# Patient Record
Sex: MALE | Race: WHITE | Employment: PART TIME | URBAN - METROPOLITAN AREA
[De-identification: names, ages, dates, MRNs, and addresses within clinical notes are randomized per-mention and may not be internally consistent; named-entity substitution may affect disease eponyms.]

---

## 2018-10-07 ENCOUNTER — HOSPITAL ENCOUNTER (EMERGENCY)
Age: 33
Discharge: HOME | End: 2018-10-07
Payer: COMMERCIAL

## 2018-10-07 VITALS
RESPIRATION RATE: 16 BRPM | WEIGHT: 150 LBS | OXYGEN SATURATION: 98 % | HEIGHT: 71 IN | HEART RATE: 90 BPM | TEMPERATURE: 98.6 F | SYSTOLIC BLOOD PRESSURE: 132 MMHG | DIASTOLIC BLOOD PRESSURE: 84 MMHG | BODY MASS INDEX: 21 KG/M2

## 2018-10-07 DIAGNOSIS — T15.91XA FOREIGN BODY OF RIGHT EYE, INITIAL ENCOUNTER: Primary | ICD-10-CM

## 2018-10-07 PROCEDURE — 99282 EMERGENCY DEPT VISIT SF MDM: CPT

## 2018-10-07 PROCEDURE — 6370000000 HC RX 637 (ALT 250 FOR IP): Performed by: PHYSICIAN ASSISTANT

## 2018-10-07 PROCEDURE — 4500000022 HC ED LEVEL 2 PROCEDURE

## 2018-10-07 RX ORDER — BACITRACIN ZINC AND POLYMYXIN B SULFATE 500; 1000 [USP'U]/G; [USP'U]/G
OINTMENT TOPICAL 3 TIMES DAILY
Status: DISCONTINUED | OUTPATIENT
Start: 2018-10-07 | End: 2018-10-07

## 2018-10-07 RX ORDER — BACITRACIN ZINC AND POLYMYXIN B SULFATE 500; 10000 [USP'U]/G; [USP'U]/G
OINTMENT OPHTHALMIC 3 TIMES DAILY
Status: DISCONTINUED | OUTPATIENT
Start: 2018-10-07 | End: 2018-10-07 | Stop reason: HOSPADM

## 2018-10-07 RX ADMIN — BACITRACIN ZINC AND POLYMYXIN B SULFATES: 500; 10000 OINTMENT OPHTHALMIC at 20:11

## 2018-10-07 ASSESSMENT — PAIN DESCRIPTION - LOCATION: LOCATION: EAR

## 2018-10-07 ASSESSMENT — PAIN SCALES - GENERAL: PAINLEVEL_OUTOF10: 2

## 2018-10-07 ASSESSMENT — PAIN DESCRIPTION - PAIN TYPE: TYPE: ACUTE PAIN

## 2018-10-07 ASSESSMENT — PAIN DESCRIPTION - ORIENTATION: ORIENTATION: RIGHT

## 2018-10-08 NOTE — ED PROVIDER NOTES
[facility]  ED  eMERGENCY dEPARTMENT eNCOUnter        Pt Name: Corinne Steve  MRN: [id number]  Jeanettegfgenevieve 1985  Date of evaluation: 10/7/2018  Provider: Magdiel Cruz PA-C  PCP: No primary care provider on file. ED Attending: Gregg Loyd MD      History is provided by the patient    CHIEF COMPLAINT:  Foreign Body in Eye (was working with metal and thinks a piece got in right eye )      HISTORY OF PRESENT ILLNESS:  Corinne Steve is a 29 y.o. male who presents to the ED via private vehicle with complaints of metal in right eye. The patient was working with metal today and believes he got a piece of metal in his right eye. He states this has happened to him on multiple occasions in the past. He has been seen in the ED in the past for this as well as at [facility]. He does not wear contact lenses or glasses. The incident happened just prior to arrival.  He is able to see small cortney of dirt or metal just medial to the right cornea but was unsuccessful in his attempts at getting out at home. He denies any visual change. He is just very slight pain/irritation that he rates 2/10. He has no other complaints. He tells me his tetanus vaccination is up-to-date, 2009. There are no modifying factors or associated symptoms. Nursing notes reviewed. History reviewed. No pertinent past medical history. Past Surgical History:   Procedure Laterality Date    WISDOM TOOTH EXTRACTION       History reviewed. No pertinent family history. Social History     Social History    Marital status: Single     Spouse name: N/A    Number of children: N/A    Years of education: N/A     Occupational History    Not on file.      Social History Main Topics    Smoking status: Former Smoker     Packs/day: 1.00     Years: 10.00     Types: Cigarettes     Quit date: 3/1/2016    Smokeless tobacco: Never Used    Alcohol use No    Drug use: No    Sexual activity: Not on file     Other Topics Concern    Not on file     Social History Narrative    No narrative on file     Current Facility-Administered Medications   Medication Dose Route Frequency Provider Last Rate Last Dose    bacitracin-polymyxin b (POLYSPORIN) ophthalmic ointment   Right Eye TID SANTO Florez         No current outpatient prescriptions on file. Allergies   Allergen Reactions    Erythromycin Swelling       REVIEW OF SYSTEMS:  6 systems reviewed, pertinent positives per HPI otherwise noted to be negative. PHYSICAL EXAM:  /84   Pulse 90   Temp 98.6 °F (37 °C) (Oral)   Resp 16   Ht 5' 11\" (1.803 m)   Wt 150 lb (68 kg)   SpO2 98%   BMI 20.92 kg/m²   CONSTITUTIONAL: Awake and alert. Well-developed. Well-nourished. Non-toxic. Cooperative. No acute distress. HENT: Normocephalic. Atraumatic. External ears normal, without discharge. Nose normal. Mucous membranes moist.  EYES: Right conjunctiva injected. Left conjunctiva noninjected. No discharge. No scleral icterus. PERRL. EOM's grossly intact. On gross inspection of right eye there is a small, dark, foreign body noted just medial to the right cornea on the sclera. NECK: Supple. Normal ROM. CARDIOVASCULAR: Normal heart rate. Intact distal pulses. PULMONARY/CHEST WALL: Breathing is unlabored. Equal, symmetric chest rise. Speaking comfortably in full sentences. ABDOMEN: Nondistended  MUSKULOSKELETAL: Normal ROM. No acute deformities. SKIN: Warm and dry. NEUROLOGICAL: Alert and oriented x 3. Strength is 5/5 in all extremities and sensation is intact. PSYCHIATRIC: Normal affect    Labs:    NONE    RADIOLOGY:    NONE                    PROCEDURE: Foreign body removal:  Yusra Hamm or their surrogate had an opportunity to ask questions, and the risks, benefits, and alternatives were discussed. A local anesthetic (tetracaine) was used to completely anesthetize the surface of the eye.   At the patient's request, I initially used a cotton tip applicator coated with

## 2018-10-08 NOTE — ED NOTES
Patient verbalized understanding of d/c instructions. No prescriptions given.       Owen Cano LPN  63/23/06 [id number]

## 2018-10-11 ENCOUNTER — OFFICE VISIT (OUTPATIENT)
Dept: INTERNAL MEDICINE CLINIC | Age: 33
End: 2018-10-11
Payer: COMMERCIAL

## 2018-10-11 VITALS
WEIGHT: 150.6 LBS | BODY MASS INDEX: 20.4 KG/M2 | SYSTOLIC BLOOD PRESSURE: 120 MMHG | HEIGHT: 72 IN | DIASTOLIC BLOOD PRESSURE: 72 MMHG

## 2018-10-11 DIAGNOSIS — Z00.00 WELL ADULT EXAM: Primary | ICD-10-CM

## 2018-10-11 PROCEDURE — G8427 DOCREV CUR MEDS BY ELIG CLIN: HCPCS | Performed by: NURSE PRACTITIONER

## 2018-10-11 PROCEDURE — 1036F TOBACCO NON-USER: CPT | Performed by: NURSE PRACTITIONER

## 2018-10-11 PROCEDURE — G8420 CALC BMI NORM PARAMETERS: HCPCS | Performed by: NURSE PRACTITIONER

## 2018-10-11 PROCEDURE — 99385 PREV VISIT NEW AGE 18-39: CPT | Performed by: NURSE PRACTITIONER

## 2018-10-11 PROCEDURE — G8484 FLU IMMUNIZE NO ADMIN: HCPCS | Performed by: NURSE PRACTITIONER

## 2018-10-11 ASSESSMENT — ENCOUNTER SYMPTOMS
EYE DISCHARGE: 0
DIARRHEA: 0
SHORTNESS OF BREATH: 0
NAUSEA: 0
VOMITING: 0
WHEEZING: 0
ABDOMINAL PAIN: 0
COUGH: 0
BLOOD IN STOOL: 0
CONSTIPATION: 0

## 2018-10-11 ASSESSMENT — PATIENT HEALTH QUESTIONNAIRE - PHQ9
1. LITTLE INTEREST OR PLEASURE IN DOING THINGS: 0
SUM OF ALL RESPONSES TO PHQ QUESTIONS 1-9: 0
SUM OF ALL RESPONSES TO PHQ QUESTIONS 1-9: 0
SUM OF ALL RESPONSES TO PHQ9 QUESTIONS 1 & 2: 0
2. FEELING DOWN, DEPRESSED OR HOPELESS: 0

## 2018-10-11 NOTE — PROGRESS NOTES
Genitourinary: Negative for dysuria and hematuria. Musculoskeletal: Negative for myalgias. Skin: Negative for rash. Neurological: Negative for dizziness. Psychiatric/Behavioral: The patient is not nervous/anxious. Physical Exam   Constitutional: He is oriented to person, place, and time. No distress. HENT:   Right Ear: External ear normal.   Left Ear: External ear normal.   Mouth/Throat: Oropharynx is clear and moist. No oropharyngeal exudate. Eyes: Right eye exhibits no discharge. Left eye exhibits no discharge. No scleral icterus. Neck: Normal range of motion. No thyromegaly present. Cardiovascular: Normal rate, regular rhythm, normal heart sounds and intact distal pulses. Exam reveals no gallop and no friction rub. No murmur heard. Pulmonary/Chest: Effort normal and breath sounds normal. He has no wheezes. Abdominal: Soft. Bowel sounds are normal. He exhibits no distension. There is no tenderness. Musculoskeletal: Normal range of motion. He exhibits no edema or tenderness. Lymphadenopathy:     He has no cervical adenopathy. Neurological: He is alert and oriented to person, place, and time. He displays normal reflexes. No cranial nerve deficit. He exhibits normal muscle tone. Coordination normal.   Cn 2-12 intact   Skin: Skin is warm and dry. No rash noted. He is not diaphoretic. No erythema. Psychiatric: Judgment normal.      Diagnosis Orders   1.  Well adult exam        PE completed  Form of findings filled out; he will return next week to start 2 part PPD testing with documentation  PMH info scanned to chart  Flu shot refused    Ji Alves, APRN - CNP

## 2018-11-28 ENCOUNTER — OFFICE VISIT (OUTPATIENT)
Dept: INTERNAL MEDICINE CLINIC | Age: 33
End: 2018-11-28
Payer: COMMERCIAL

## 2018-11-28 VITALS
HEIGHT: 71 IN | DIASTOLIC BLOOD PRESSURE: 70 MMHG | BODY MASS INDEX: 21 KG/M2 | SYSTOLIC BLOOD PRESSURE: 120 MMHG | WEIGHT: 150 LBS | TEMPERATURE: 98.8 F

## 2018-11-28 DIAGNOSIS — B35.1 ONYCHOMYCOSIS OF GREAT TOE: Primary | ICD-10-CM

## 2018-11-28 PROCEDURE — G8427 DOCREV CUR MEDS BY ELIG CLIN: HCPCS | Performed by: NURSE PRACTITIONER

## 2018-11-28 PROCEDURE — 99213 OFFICE O/P EST LOW 20 MIN: CPT | Performed by: NURSE PRACTITIONER

## 2018-11-28 PROCEDURE — 1036F TOBACCO NON-USER: CPT | Performed by: NURSE PRACTITIONER

## 2018-11-28 PROCEDURE — G8420 CALC BMI NORM PARAMETERS: HCPCS | Performed by: NURSE PRACTITIONER

## 2018-11-28 PROCEDURE — G8484 FLU IMMUNIZE NO ADMIN: HCPCS | Performed by: NURSE PRACTITIONER

## 2018-11-28 ASSESSMENT — ENCOUNTER SYMPTOMS
SHORTNESS OF BREATH: 0
EYE DISCHARGE: 0
BLOOD IN STOOL: 0
VOMITING: 0
WHEEZING: 0
DIARRHEA: 0
ABDOMINAL PAIN: 0
NAUSEA: 0
CONSTIPATION: 0
COUGH: 0

## 2019-08-08 ENCOUNTER — TELEPHONE (OUTPATIENT)
Dept: INTERNAL MEDICINE CLINIC | Age: 34
End: 2019-08-08

## 2020-02-06 ENCOUNTER — TELEPHONE (OUTPATIENT)
Dept: INTERNAL MEDICINE CLINIC | Age: 35
End: 2020-02-06

## 2020-02-06 NOTE — TELEPHONE ENCOUNTER
Having a nose issue looking for a referral to ENT has post nasal drip states its a chronic thing. Had a prior referral to ENT and he states that he never went. Offered to give him the information and he does not want it wants to know what you think? Susan Nievesky   1985    Is requesting a referral to see the following specialist: ENT  Name of provider / Doctor: Unknown    Phone. Unknown  Fax. Unknown  Specialty: ENT  Specialist appointment scheduled (DOS)? Not schd yet  Primary reason / diagnosis (ICD. 10code) for the appointment: Post nasal drip  Primary Insurance: [facility] [facility])  Last office visit: 11/28/2018    Pending office visit: No future appointments.

## 2020-02-06 NOTE — TELEPHONE ENCOUNTER
For chronic PND would suggest allergra or claritin, flonase with one spray each nares twice a day and salt water gargles. Would not at this time consider an ENT referral.  I believe he was here to get a form filled out in 2018 and stated his intention to establish but has never returned.  If he wants to be followed here, needs to have an appointment

## 2020-02-11 ENCOUNTER — OFFICE VISIT (OUTPATIENT)
Dept: INTERNAL MEDICINE CLINIC | Age: 35
End: 2020-02-11
Payer: COMMERCIAL

## 2020-02-11 VITALS
WEIGHT: 148 LBS | HEART RATE: 54 BPM | BODY MASS INDEX: 20.05 KG/M2 | OXYGEN SATURATION: 98 % | DIASTOLIC BLOOD PRESSURE: 70 MMHG | SYSTOLIC BLOOD PRESSURE: 100 MMHG | HEIGHT: 72 IN

## 2020-02-11 PROCEDURE — G8427 DOCREV CUR MEDS BY ELIG CLIN: HCPCS | Performed by: NURSE PRACTITIONER

## 2020-02-11 PROCEDURE — 1036F TOBACCO NON-USER: CPT | Performed by: NURSE PRACTITIONER

## 2020-02-11 PROCEDURE — 99213 OFFICE O/P EST LOW 20 MIN: CPT | Performed by: NURSE PRACTITIONER

## 2020-02-11 PROCEDURE — G8420 CALC BMI NORM PARAMETERS: HCPCS | Performed by: NURSE PRACTITIONER

## 2020-02-11 PROCEDURE — G8484 FLU IMMUNIZE NO ADMIN: HCPCS | Performed by: NURSE PRACTITIONER

## 2020-02-11 ASSESSMENT — PATIENT HEALTH QUESTIONNAIRE - PHQ9
SUM OF ALL RESPONSES TO PHQ9 QUESTIONS 1 & 2: 0
1. LITTLE INTEREST OR PLEASURE IN DOING THINGS: 0
SUM OF ALL RESPONSES TO PHQ QUESTIONS 1-9: 0
SUM OF ALL RESPONSES TO PHQ QUESTIONS 1-9: 0
2. FEELING DOWN, DEPRESSED OR HOPELESS: 0

## 2020-02-11 ASSESSMENT — ENCOUNTER SYMPTOMS
EYE DISCHARGE: 0
CONSTIPATION: 0
VOMITING: 0
WHEEZING: 0
BLOOD IN STOOL: 0
NAUSEA: 0
COUGH: 0
ABDOMINAL PAIN: 0
SHORTNESS OF BREATH: 0
DIARRHEA: 0

## 2020-02-11 NOTE — PATIENT INSTRUCTIONS
Allegra or claritin  Sinus rinse twice a day followed by:  Flonase-1 spray each nares twice  A day  Salt water gargles both morning and night  With any recurrence of chest tightness associated with running call for an order for albuterol inhaler

## 2020-08-21 ENCOUNTER — TELEPHONE (OUTPATIENT)
Dept: INTERNAL MEDICINE CLINIC | Age: 35
End: 2020-08-21

## 2020-08-21 NOTE — TELEPHONE ENCOUNTER
----- Message from [place], [place] sent at 8/21/2020  1:43 PM EDT -----  Subject: Appointment Request    Reason for Call: Routine Physical Exam    QUESTIONS  Type of Appointment? Established Patient  Reason for appointment request? No appointments available during search  Additional Information for Provider? Pt needs to have a basic physical to   be able to join  classes. He needs this no later than Tuesday. He was referred to  urgent care but he wants to stay away from any Covid   testing sites. Can anyone accommodate Mr. Drea Macdonald with his request. Please   call  ---------------------------------------------------------------------------  --------------  CALL BACK INFO  What is the best way for the office to contact you? OK to leave message on   voicemail  Preferred Call Back Phone Number? [phone]  ---------------------------------------------------------------------------  --------------  SCRIPT ANSWERS  Relationship to Patient? Self  Appointment reason? Well Care/Follow Ups  Select a Well Care/Follow Ups appointment reason? Adult Physical Exam   [Medicare Annual Wellness   AWV   PAP   Pelvic]  (Is the patient requesting to be seen urgently for their symptoms?)? No  (If the patient is female   ask this question) Are you requesting a pap smear with your physical   exam? No  (Patient is Medicare and requesting Annual Wellness Visit)? No  Have you been diagnosed with   tested for   or told that you are suspected of having COVID-19 (Coronavirus)? No  Have you had a fever or taken medication to treat a fever within the past   3 days? No  Have you had a cough   shortness of breath or flu-like symptoms within the past 3 days? No  Do you currently have flu-like symptoms including fever or chills   cough   shortness of breath   or difficulty breathing   or new loss of taste or smell? No  (Service Expert  click yes below to proceed with [facility] [employer] As Usual   Scheduling)?  Yes Spoke with pt and conveyed her results/recommendations as per MD.    Pt wondering if having been on abx previously may have contributed to these findings/sx. Reports onset of sx correlated after she had been on abx a while back. Conveys has dental appt next week, needs prophylactic abx and asking if that will affect GI tract/if anything that can be done to prevent irritation if she needs abx in future.    Dr. Brown- please advise if you feel any role in prior abx therapy with relation to pt sx?      Pt requests nursing to send message w/her results to her in secure portal also so she has in writing. Will send after hear back from physician re: her other question.

## 2021-02-24 ENCOUNTER — OFFICE VISIT (OUTPATIENT)
Dept: INTERNAL MEDICINE CLINIC | Age: 36
End: 2021-02-24
Payer: COMMERCIAL

## 2021-02-24 VITALS
HEART RATE: 55 BPM | HEIGHT: 71 IN | DIASTOLIC BLOOD PRESSURE: 62 MMHG | BODY MASS INDEX: 20.72 KG/M2 | TEMPERATURE: 98.3 F | WEIGHT: 148 LBS | SYSTOLIC BLOOD PRESSURE: 106 MMHG | OXYGEN SATURATION: 98 %

## 2021-02-24 DIAGNOSIS — Z02.1 PRE-EMPLOYMENT EXAMINATION: ICD-10-CM

## 2021-02-24 DIAGNOSIS — Z00.00 ENCOUNTER FOR WELL ADULT EXAM WITHOUT ABNORMAL FINDINGS: Primary | ICD-10-CM

## 2021-02-24 PROCEDURE — G8484 FLU IMMUNIZE NO ADMIN: HCPCS | Performed by: NURSE PRACTITIONER

## 2021-02-24 PROCEDURE — 99395 PREV VISIT EST AGE 18-39: CPT | Performed by: NURSE PRACTITIONER

## 2021-02-24 ASSESSMENT — ENCOUNTER SYMPTOMS
VOMITING: 0
DIARRHEA: 0
SORE THROAT: 0
SINUS PAIN: 0
NAUSEA: 0
CONSTIPATION: 0
CHEST TIGHTNESS: 0
SHORTNESS OF BREATH: 0
COUGH: 0

## 2021-02-24 ASSESSMENT — PATIENT HEALTH QUESTIONNAIRE - PHQ9
SUM OF ALL RESPONSES TO PHQ QUESTIONS 1-9: 0
SUM OF ALL RESPONSES TO PHQ9 QUESTIONS 1 & 2: 0
SUM OF ALL RESPONSES TO PHQ QUESTIONS 1-9: 0
1. LITTLE INTEREST OR PLEASURE IN DOING THINGS: 0

## 2021-02-24 NOTE — PROGRESS NOTES
[address]  [facility]  [address], [address] [place] [address], [place] [address]  Tel:[phone]    Seven Mckeon is a 37 y.o. male who presents today for his medical conditions/complaints as noted below. Seven cMkeon is c/o of Annual Exam      Chief Complaint   Patient presents with    Annual Exam       HPI:     Mr. Mona Pond presents for annual check up/physical for [employer]. He states is overall well and denies current concerns. 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 Overall very healthy. Tries to balance diet and runs daily for exercise. Entire medical history, surgical history, family history, allergies, social history, and health maintenance items reviewed and updated as captured in the relevant section of patient's chart. History reviewed. No pertinent past medical history. Past Surgical History:   Procedure Laterality Date    MOUTH SURGERY  2018    WISDOM TOOTH EXTRACTION         Family History   Problem Relation Age of Onset    Diabetes Father     Heart Disease Maternal Grandmother     Cancer Maternal Grandmother     Cancer Paternal Grandmother     Heart Disease Paternal Grandmother     Stroke Paternal Grandfather     Cancer Maternal Cousin        Social History     Tobacco Use    Smoking status: Former Smoker     Packs/day: 1.00     Years: 10.00     Pack years: 10.00     Types: Cigarettes     Start date:      Quit date: 3/1/2016     Years since quittin.9    Smokeless tobacco: Former User     Quit date: 3/7/2018   Substance Use Topics    Alcohol use: No     Alcohol/week: 0.0 standard drinks        No current outpatient medications on file. No current facility-administered medications for this visit. Allergies   Allergen Reactions    Erythromycin Swelling       Subjective:      Review of Systems   Constitutional: Negative for fever.    HENT: Negative for sinus pain and sore throat. Respiratory: Negative for cough, chest tightness and shortness of breath. Cardiovascular: Negative for chest pain and palpitations. Gastrointestinal: Negative for constipation, diarrhea, nausea and vomiting. Genitourinary: Negative for dysuria and urgency. Skin: Negative for rash. Neurological: Negative for dizziness and weakness. Objective:     Vitals:    02/24/21 0958   BP: 106/62   Site: Left Upper Arm   Position: Sitting   Cuff Size: Medium Adult   Pulse: 55   Temp: 98.3 °F (36.8 °C)   TempSrc: Infrared   SpO2: 98%   Weight: 148 lb (67.1 kg)   Height: 5' 11\" (1.803 m)       Physical Exam  Vitals signs and nursing note reviewed. Constitutional:       Appearance: Normal appearance. He is well-developed. HENT:      Head: Normocephalic and atraumatic. Right Ear: Hearing and external ear normal.      Left Ear: Hearing and external ear normal.      Nose: Nose normal.   Eyes:      General: Lids are normal.      Pupils: Pupils are equal, round, and reactive to light. Neck:      Musculoskeletal: Normal range of motion. Cardiovascular:      Rate and Rhythm: Normal rate and regular rhythm. No extrasystoles are present. Chest Wall: PMI is not displaced. Pulses: Normal pulses. Heart sounds: Normal heart sounds, S1 normal and S2 normal. Heart sounds not distant. No murmur. No systolic murmur. No diastolic murmur. No friction rub. No gallop. No S3 or S4 sounds. Pulmonary:      Effort: Pulmonary effort is normal. No accessory muscle usage or respiratory distress. Breath sounds: Normal breath sounds. No decreased breath sounds, wheezing, rhonchi or rales. Abdominal:      General: Bowel sounds are normal.      Palpations: Abdomen is soft. Skin:     General: Skin is warm and dry. Neurological:      Mental Status: He is alert. Psychiatric:         Speech: Speech normal.         Assessment & Plan:      The following diagnoses and conditions are stable with no further orders unless indicated:    1. Encounter for well adult exam without abnormal findings    2. Pre-employment examination        Marci Todd was seen today for annual exam.    Diagnoses and all orders for this visit:    Encounter for well adult exam without abnormal findings    Pre-employment examination      Forms completed. He is to have a drug screening (ethos) for program requirements. 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 Continue healthy lifestyle choices. Discussed healthy lifestyle habits at length (encouraged regular exercise, healthy diet, no smoking, adequate sleep, sunscreen, safety items (car/driving, illness prevention.)    Return in about 1 year (around 2/24/2022) for Simin Harris. Total time spent reviewing patient chart, vitals, assessment, documentation: 25 min    Patientshould call the office immediately with new or ongoing signs or symptoms or worsening, or proceed to the emergency room. If you are on medications which could impair your senses, you are at risk of weakness, falls,dizziness, or drowsiness. You should be careful during activities which could place you at risk of harm, such as climbing, using stairs, operating machinery, or driving vehicles. If you feel you cannot safely do theseactivities, you should request others to help you, or avoid the activities altogether. If you are drowsy for any other reason, you should use the same precautions as listed above. Call if pattern of symptoms change or persists for an extended time.       Layvonne Heimlich

## 2021-03-03 ENCOUNTER — TELEPHONE (OUTPATIENT)
Dept: INTERNAL MEDICINE CLINIC | Age: 36
End: 2021-03-03

## 2021-03-03 NOTE — TELEPHONE ENCOUNTER
Patient saw [facility] and a drug test was done. Would like the results emailed to: [email or web address]. [email or web address]

## 2021-03-05 PROBLEM — Z87.448 H/O URETHRAL STRICTURE: Status: ACTIVE | Noted: 2018-08-20

## 2021-03-05 NOTE — TELEPHONE ENCOUNTER
This is not a valid email address. I had to get a different one. Results were emailed and patient was notified.

## 2022-02-14 ENCOUNTER — HOSPITAL ENCOUNTER (EMERGENCY)
Age: 37
Discharge: HOME | End: 2022-02-14
Attending: EMERGENCY MEDICINE
Payer: COMMERCIAL

## 2022-02-14 VITALS
SYSTOLIC BLOOD PRESSURE: 135 MMHG | HEIGHT: 71 IN | WEIGHT: 155.1 LBS | OXYGEN SATURATION: 100 % | TEMPERATURE: 98.7 F | DIASTOLIC BLOOD PRESSURE: 89 MMHG | BODY MASS INDEX: 21.71 KG/M2 | RESPIRATION RATE: 14 BRPM | HEART RATE: 74 BPM

## 2022-02-14 DIAGNOSIS — N35.811 OTHER STRICTURE OF URETHRAL MEATUS IN MALE: Primary | ICD-10-CM

## 2022-02-14 DIAGNOSIS — R31.9 URINARY TRACT INFECTION WITH HEMATURIA, SITE UNSPECIFIED: ICD-10-CM

## 2022-02-14 DIAGNOSIS — N39.0 URINARY TRACT INFECTION WITH HEMATURIA, SITE UNSPECIFIED: ICD-10-CM

## 2022-02-14 LAB
BACTERIA: ABNORMAL /HPF
BILIRUBIN URINE: NEGATIVE
BLOOD, URINE: ABNORMAL
CLARITY: CLEAR
COLOR: YELLOW
EPITHELIAL CELLS, UA: ABNORMAL /HPF (ref 0–5)
GLUCOSE URINE: NEGATIVE MG/DL
KETONES, URINE: NEGATIVE MG/DL
LEUKOCYTE ESTERASE, URINE: ABNORMAL
MICROSCOPIC EXAMINATION: YES
NITRITE, URINE: NEGATIVE
PH UA: 7 (ref 5–8)
PROTEIN UA: ABNORMAL MG/DL
RBC UA: ABNORMAL /HPF (ref 0–4)
SPECIFIC GRAVITY UA: 1.01 (ref 1–1.03)
URINE TYPE: ABNORMAL
UROBILINOGEN, URINE: 0.2 E.U./DL
WBC UA: ABNORMAL /HPF (ref 0–5)

## 2022-02-14 PROCEDURE — 87086 URINE CULTURE/COLONY COUNT: CPT

## 2022-02-14 PROCEDURE — 87077 CULTURE AEROBIC IDENTIFY: CPT

## 2022-02-14 PROCEDURE — 6370000000 HC RX 637 (ALT 250 FOR IP): Performed by: EMERGENCY MEDICINE

## 2022-02-14 PROCEDURE — 99283 EMERGENCY DEPT VISIT LOW MDM: CPT

## 2022-02-14 PROCEDURE — 81001 URINALYSIS AUTO W/SCOPE: CPT

## 2022-02-14 PROCEDURE — 87186 SC STD MICRODIL/AGAR DIL: CPT

## 2022-02-14 PROCEDURE — 51798 US URINE CAPACITY MEASURE: CPT

## 2022-02-14 RX ORDER — CEPHALEXIN 500 MG/1
1000 CAPSULE ORAL 2 TIMES DAILY
Qty: 56 CAPSULE | Refills: 0 | Status: SHIPPED | OUTPATIENT
Start: 2022-02-14 | End: 2022-02-28

## 2022-02-14 RX ORDER — CEPHALEXIN 500 MG/1
1000 CAPSULE ORAL ONCE
Status: COMPLETED | OUTPATIENT
Start: 2022-02-14 | End: 2022-02-14

## 2022-02-14 RX ADMIN — CEPHALEXIN 1000 MG: 500 CAPSULE ORAL at 23:13

## 2022-02-15 NOTE — ED PROVIDER NOTES
[facility] EMERGENCY DEPT VISIT      Patient Identification  Samantha Flynn is a 31 y.o. male. Chief Complaint   Urinary Tract Infection      History of Present Illness: This is a  31 y.o. male who presents ambulatory  to the ED with complaints of 3 day h/o trouble urinating. Patient has a history of urethritis in the past and did see urology a few years years ago. It was recommended that he have a cystogram and other testing done but he did not return to have it done. Patient states that he was treated with a 1 month course of doxycycline in 2020 and also received Keflex on occasion. He states that he was using a dilator a couple of times a day however felt that it was not reaching the correct spot where he felt like he had a stricture. The patient has not had a cystoscopy or any type of functional voiding test.  He states that his stream is never narrow as his meatus is very tight. He feels like a few centimeters up he may have a stricture and sometimes has to milk the urine down through the urethra.  2 days ago he felt a pop like he has felt before and says that he has been having difficulty urinating ever since then. Initially it was burning and he noticed some small amount of blood at the end of stream.  He did try to dilate himself or push tissue back using a Issa pin. He has noticed some enlarged lymph nodes in his groin. No testicular pain or swelling. No abdominal pain or flank pain. No fever. No nausea or vomiting. History reviewed. No pertinent past medical history. Past Surgical History:   Procedure Laterality Date    MOUTH SURGERY  03/2018    WISDOM TOOTH EXTRACTION         No current facility-administered medications for this encounter.     Current Outpatient Medications:     cephALEXin (KEFLEX) 500 MG capsule, Take 2 capsules by mouth 2 times daily for 14 days, Disp: 56 capsule, Rfl: 0    Allergies   Allergen Reactions    Erythromycin Swelling       Social History Socioeconomic History    Marital status: Single     Spouse name: Not on file    Number of children: Not on file    Years of education: Not on file    Highest education level: Not on file   Occupational History    Not on file   Tobacco Use    Smoking status: Former Smoker     Packs/day: 1.00     Years: 10.00     Pack years: 10.00     Types: Cigarettes     Start date:      Quit date: 3/1/2016     Years since quittin.9    Smokeless tobacco: Former User     Quit date: 3/7/2018   Vaping Use    Vaping Use: Never used   Substance and Sexual Activity    Alcohol use: No     Alcohol/week: 0.0 standard drinks    Drug use: No    Sexual activity: Yes     Partners: Female   Other Topics Concern    Not on file   Social History Narrative    Not on file     Social Determinants of Health     Financial Resource Strain:     Difficulty of Paying Living Expenses: Not on file   Food Insecurity:     Worried About [address] in the Last Year: Not on file    [address] in the Last Year: Not on file   Transportation Needs:     Lack of Transportation (Medical): Not on file    Lack of Transportation (Non-Medical):  Not on file   Physical Activity:     Days of Exercise per Week: Not on file    Minutes of Exercise per Session: Not on file   Stress:     Feeling of Stress : Not on file   Social Connections:     Frequency of Communication with Friends and Family: Not on file    Frequency of Social Gatherings with Friends and Family: Not on file    Attends [employer] Services: Not on file    Active Member of Clubs or Organizations: Not on file    Attends Club or Organization Meetings: Not on file    Marital Status: Not on file   Intimate Partner Violence:     Fear of Current or Ex-Partner: Not on file    Emotionally Abused: Not on file    Physically Abused: Not on file    Sexually Abused: Not on file   Housing Stability:     Unable to Pay for Housing in the Last Year: Not on file    Number of Places Lived in the Last Year: Not on file    Unstable Housing in the Last Year: Not on file       Nursing Notes Reviewed      ROS:  General: no fever  ENT: no sinus congestion, no sore throat  RESP: no cough, no shortness of breath  CARDIAC: no chest pain  GI: no abdominal pain, no vomiting, no diarrhea  : + dysuria, + hematuria, no retention, no incontinence  Musculoskeletal: no arthralgia, no myalgia, no back pain,  no joint swelling  NEURO: no headache, no numbness, no weakness, no dizziness  DERM: no rash, no erythema, no ecchymosis, no wounds      PHYSICAL EXAM:  GENERAL APPEARANCE: iMke Mena is in no acute respiratory distress. Awake and alert. VITAL SIGNS:   ED Triage Vitals [02/14/22 2138]   Enc Vitals Group      /89      Pulse 74      Resp 14      Temp 98.7 °F (37.1 °C)      Temp Source Oral      SpO2 100 %      Weight 155 lb 1.6 oz (70.4 kg)      Height 5' 11\" (1.803 m)      Head Circumference       Peak Flow       Pain Score       Pain Loc       Pain Edu? Excl. in [address]? HEAD: Normocephalic, atraumatic. EYES:  Extraocular muscles are intact. Conjunctivas are pink. Negative scleral icterus. ENT:  Mucous membranes are moist.  Pharynx without erythema or exudates. NECK: Nontender and supple. CHEST: Clear to auscultation bilaterally. No rales, rhonchi, or wheezing. HEART:  Regular rate and rhythm. No murmurs. Strong and equal pulses in the upper and lower extremities. ABDOMEN: Soft,  nondistended, positive bowel sounds. abdomen is nontender. no guarding. No flank tenderness  : small meatus with mild inflamation at tip of penis. No bleeding or sores. Circumsized. Bilateral inguinal adenopathy  MUSCULOSKELETAL:  Active range of motion of the upper and lower extremities. No edema. NEUROLOGICAL: Awake, alert and oriented x 3. Power intact in the upper and lower extremities. DERMATOLOGIC: No petechiae, rashes, or ecchymoses.       ED COURSE AND MEDICAL DECISION MAKING:      Radiology:  All plain films have been evaluated by myself. They may have been overread by radiologist as noted in chart. Other radiologic studies (i.e. CT, MRI, ultrasounds, etc ) have been interpreted by radiologist.     No orders to display       Labs:  Results for orders placed or performed during the hospital encounter of 02/14/22   Urinalysis, reflex to microscopic   Result Value Ref Range    Color, UA Yellow Straw/Yellow    Clarity, UA Clear Clear    Glucose, Ur Negative Negative mg/dL    Bilirubin Urine Negative Negative    Ketones, Urine Negative Negative mg/dL    Specific Gravity, UA 1.015 1.005 - 1.030    Blood, Urine MODERATE (A) Negative    pH, UA 7.0 5.0 - 8.0    Protein, UA TRACE (A) Negative mg/dL    Urobilinogen, Urine 0.2 <2.0 E.U./dL    Nitrite, Urine Negative Negative    Leukocyte Esterase, Urine MODERATE (A) Negative    Microscopic Examination YES     Urine Type NotGiven    Microscopic Urinalysis   Result Value Ref Range    WBC, UA  (A) 0 - 5 /HPF    RBC, UA 21-50 (A) 0 - 4 /HPF    Epithelial Cells, UA 11-20 (A) 0 - 5 /HPF    Bacteria, UA 2+ (A) None Seen /HPF     Bladder scan showed 11ml    Treatment in the department:  Patient received   Medications   cephALEXin (KEFLEX) capsule 1,000 mg (1,000 mg Oral Given 2/14/22 2313)     I suggested a rocephin injection but he refused. He also wanted to receive either the doxy or keflex which he has taken before rather than try a new antibiotic    Medical decision making:  Patient with urinary frequency, urgency, dysuria and hesitancy. Has obvious meatal stenosis on exam with some skin inflamation in this region. UTI present on UA. Culture sent and pending. He is not currently having urinary retention despite the meatal stenosis. He has been noncompliant with urologic followup for cystoscopy, voiding tests, or cystourethrograms. I have highly suggested he follow through with this.  He does not want to have montes placed at any time due to being . Encouraged followup to also help prevent this need emergently. Will give course of antibiotics. He does not appear uroseptic or having pyelonephritis. No fever, flank pain, tachycardia, vomiting. Clinical Impression:  1. Other stricture of urethral meatus in male    2. Urinary tract infection with hematuria, site unspecified        Dispo:  Patient will be discharged  at this time. Patient was informed of this decision and agrees with plan. I have discussed lab and xray findings with patient and they understand. Questions were answered to the best of my ability. Discharge vitals:  Blood pressure 135/89, pulse 74, temperature 98.7 °F (37.1 °C), temperature source Oral, resp. rate 14, height 5' 11\" (1.803 m), weight 155 lb 1.6 oz (70.4 kg), SpO2 100 %. Prescriptions given:   Discharge Medication List as of 2/14/2022 11:13 PM      START taking these medications    Details   cephALEXin (KEFLEX) 500 MG capsule Take 2 capsules by mouth 2 times daily for 14 days, Disp-56 capsule, R-0Print               This chart was created using dragon voice recognition software.         Tracy Wan MD  02/15/22 9421

## 2022-02-17 ENCOUNTER — TELEPHONE (OUTPATIENT)
Dept: INTERNAL MEDICINE CLINIC | Age: 37
End: 2022-02-17

## 2022-02-17 LAB
ORGANISM: ABNORMAL
URINE CULTURE, ROUTINE: ABNORMAL

## 2022-02-17 NOTE — TELEPHONE ENCOUNTER
Spoke with patient on the phone. He prefers to stay on keflex as he sometimes has problems with doxycycline. Agreed with him that the best medicine is the one he can tolerate.    He will finish out the keflex and FU with urology

## 2022-02-17 NOTE — TELEPHONE ENCOUNTER
Patient is calling today stating he was at the ED on Monday with a UTI. He was started on Keflex 500mg and states he has had an 80% decrease in symptoms. He received a phone call and was told to start on Cephalexin 1000mg. Patient is unsure if he should change the antibiotic since he is showing improvement. The patient was educated on how a urine culture works. Patient was still not convinced as he explained how he had to pee all over his hands that had touched everything dirty.

## 2022-02-19 ENCOUNTER — TELEPHONE (OUTPATIENT)
Dept: INTERNAL MEDICINE CLINIC | Age: 37
End: 2022-02-19

## 2022-02-19 RX ORDER — DOXYCYCLINE HYCLATE 100 MG/1
100 CAPSULE ORAL 2 TIMES DAILY
Qty: 20 CAPSULE | Refills: 0 | Status: SHIPPED | OUTPATIENT
Start: 2022-02-19 | End: 2022-03-01

## 2022-02-19 NOTE — TELEPHONE ENCOUNTER
Phone call. Paged through [employer]: Patient relates urinary retention seen in emergency room/14/2022 empirically treated for UTI with Keflex. He states he was 80% better. With urine culture results for staph epidermis he was changed to doxycycline tablets. Told to stop Keflex. Over the last 24 hours has felt worse as well as some stomach upset from the doxycycline tablets. Patient states has been on doxycycline capsules in the past and done well and wishes a change. Also considering his improvement on Keflex I suggested  to restart and finish Keflex. I also sent a prescription in for doxycycline capsules as patient requested.   Encouraged him to follow-up with urologist as apparently this has been a chronic problem  Dr. Angela Edward

## 2022-04-27 ENCOUNTER — OFFICE VISIT (OUTPATIENT)
Dept: INTERNAL MEDICINE CLINIC | Age: 37
End: 2022-04-27
Payer: COMMERCIAL

## 2022-04-27 VITALS
BODY MASS INDEX: 21.34 KG/M2 | WEIGHT: 153 LBS | TEMPERATURE: 98.1 F | OXYGEN SATURATION: 98 % | DIASTOLIC BLOOD PRESSURE: 70 MMHG | SYSTOLIC BLOOD PRESSURE: 118 MMHG | HEART RATE: 65 BPM

## 2022-04-27 DIAGNOSIS — Z00.00 ENCOUNTER FOR WELL ADULT EXAM WITHOUT ABNORMAL FINDINGS: Primary | ICD-10-CM

## 2022-04-27 DIAGNOSIS — J45.909 PERSISTENT ASTHMA WITHOUT COMPLICATION, UNSPECIFIED ASTHMA SEVERITY: ICD-10-CM

## 2022-04-27 PROCEDURE — 99395 PREV VISIT EST AGE 18-39: CPT | Performed by: NURSE PRACTITIONER

## 2022-04-27 SDOH — ECONOMIC STABILITY: FOOD INSECURITY: WITHIN THE PAST 12 MONTHS, THE FOOD YOU BOUGHT JUST DIDN'T LAST AND YOU DIDN'T HAVE MONEY TO GET MORE.: NEVER TRUE

## 2022-04-27 SDOH — ECONOMIC STABILITY: FOOD INSECURITY: WITHIN THE PAST 12 MONTHS, YOU WORRIED THAT YOUR FOOD WOULD RUN OUT BEFORE YOU GOT MONEY TO BUY MORE.: NEVER TRUE

## 2022-04-27 ASSESSMENT — ENCOUNTER SYMPTOMS
EYE DISCHARGE: 0
BLOOD IN STOOL: 0
COUGH: 0
SHORTNESS OF BREATH: 1
WHEEZING: 0
CONSTIPATION: 0
NAUSEA: 0
DIARRHEA: 0
VOMITING: 0
ABDOMINAL PAIN: 0

## 2022-04-27 ASSESSMENT — PATIENT HEALTH QUESTIONNAIRE - PHQ9
SUM OF ALL RESPONSES TO PHQ QUESTIONS 1-9: 0
2. FEELING DOWN, DEPRESSED OR HOPELESS: 0
SUM OF ALL RESPONSES TO PHQ QUESTIONS 1-9: 0
SUM OF ALL RESPONSES TO PHQ QUESTIONS 1-9: 0
SUM OF ALL RESPONSES TO PHQ9 QUESTIONS 1 & 2: 0
SUM OF ALL RESPONSES TO PHQ QUESTIONS 1-9: 0
1. LITTLE INTEREST OR PLEASURE IN DOING THINGS: 0

## 2022-04-27 ASSESSMENT — SOCIAL DETERMINANTS OF HEALTH (SDOH): HOW HARD IS IT FOR YOU TO PAY FOR THE VERY BASICS LIKE FOOD, HOUSING, MEDICAL CARE, AND HEATING?: NOT HARD AT ALL

## 2022-04-27 NOTE — PROGRESS NOTES
Well Adult Note  Name: Rex Herrera Date: 2022   MRN: [id number] Sex: Male   Age: 31 y.o. Ethnicity: Non- / Non    : 1985 Race: White (non-)      Quinten Nix is here for well adult exam.  History:  Urethral stricture now being followed by urology  No other problems or meds  He does admit that from the time he was a kid with playing sports, often in Feb/Aug he experiences what he thinks might be asthma. He is also a  and sometimes has a similar experience when wearing protective mask. He previously saw pulmonology but at that time was still smoking and was told to stop smoking. No COPD was identified at that exam.  He describes experience of chest feeling tight and sx are relieved sometimes with increased activity. Weather changes seem to be a factor in episodes. Immunizations are kept UTD by [facility]    Review of Systems   Constitutional: Negative for fever. HENT: Negative for congestion. Eyes: Negative for discharge. Respiratory: Positive for shortness of breath (intermittent. questionable asthma). Negative for cough and wheezing. Cardiovascular: Negative for chest pain, palpitations and leg swelling. Gastrointestinal: Negative for abdominal pain, blood in stool, constipation, diarrhea, nausea and vomiting. Genitourinary: Negative for dysuria and hematuria. Musculoskeletal: Negative for myalgias. Skin: Negative for rash. Neurological: Negative for dizziness. Psychiatric/Behavioral: The patient is not nervous/anxious. Allergies   Allergen Reactions    Erythromycin Swelling         Prior to Visit Medications    Not on File       History reviewed. No pertinent past medical history.     Past Surgical History:   Procedure Laterality Date    MOUTH SURGERY  2018    WISDOM TOOTH EXTRACTION           Family History   Problem Relation Age of Onset    Diabetes Father     Heart Disease Maternal Grandmother     Cancer Maternal Grandmother     Cancer Paternal Grandmother     Heart Disease Paternal Grandmother     Stroke Paternal Grandfather     Cancer Maternal Cousin        Social History     Tobacco Use    Smoking status: Former Smoker     Packs/day: 1.00     Years: 10.00     Pack years: 10.00     Types: Cigarettes     Start date:      Quit date: 3/1/2016     Years since quittin.1    Smokeless tobacco: Former User     Quit date: 3/7/2018   Vaping Use    Vaping Use: Never used   Substance Use Topics    Alcohol use: No     Alcohol/week: 0.0 standard drinks    Drug use: No       Objective   /70   Pulse 65   Temp 98.1 °F (36.7 °C)   Wt 153 lb (69.4 kg)   SpO2 98%   BMI 21.34 kg/m²   Wt Readings from Last 3 Encounters:   22 153 lb (69.4 kg)   22 155 lb 1.6 oz (70.4 kg)   21 148 lb (67.1 kg)     There were no vitals filed for this visit. Physical Exam  Constitutional:       General: He is not in acute distress. Appearance: He is not diaphoretic. HENT:      Right Ear: External ear normal.      Left Ear: External ear normal.      Mouth/Throat:      Pharynx: No oropharyngeal exudate. Eyes:      General: No scleral icterus. Right eye: No discharge. Left eye: No discharge. Neck:      Thyroid: No thyromegaly. Cardiovascular:      Rate and Rhythm: Normal rate and regular rhythm. Heart sounds: Normal heart sounds. No murmur heard. No friction rub. No gallop. Pulmonary:      Effort: Pulmonary effort is normal.      Breath sounds: Normal breath sounds. No wheezing. Abdominal:      General: Bowel sounds are normal. There is no distension. Palpations: Abdomen is soft. Tenderness: There is no abdominal tenderness. Musculoskeletal:         General: No tenderness. Normal range of motion. Cervical back: Normal range of motion. Lymphadenopathy:      Cervical: No cervical adenopathy. Skin:     General: Skin is warm and dry.       Findings: No

## 2022-04-27 NOTE — PATIENT INSTRUCTIONS

## 2022-04-29 ENCOUNTER — TELEPHONE (OUTPATIENT)
Dept: PULMONOLOGY | Age: 37
End: 2022-04-29

## 2022-04-29 NOTE — TELEPHONE ENCOUNTER
Pt referred back to office from Joseph Veliz CNP  for possible asthma/rescue inhaler. Patient said that he has shortness of breath/chest tightening, flare ups 1-2 times a year and he notices it more when he is running. CNP is wondering if Dr. Lori Campbell thinks pt might need a rescue inhaler. Patient has not been seen since 2016, PFTs were done at that time. Would you like to see pt first for a consult or testing prior? Patient was told the 1st available is not until July. He said he has been dealing with this for 34 years and a couple of months is not going to be a problem.

## 2022-06-14 ENCOUNTER — OFFICE VISIT (OUTPATIENT)
Dept: INTERNAL MEDICINE CLINIC | Age: 37
End: 2022-06-14
Payer: COMMERCIAL

## 2022-06-14 VITALS
TEMPERATURE: 98.4 F | DIASTOLIC BLOOD PRESSURE: 68 MMHG | WEIGHT: 148 LBS | BODY MASS INDEX: 20.72 KG/M2 | HEIGHT: 71 IN | SYSTOLIC BLOOD PRESSURE: 130 MMHG | OXYGEN SATURATION: 96 % | HEART RATE: 58 BPM

## 2022-06-14 DIAGNOSIS — Z00.00 ENCOUNTER FOR WELL ADULT EXAM WITHOUT ABNORMAL FINDINGS: Primary | ICD-10-CM

## 2022-06-14 PROCEDURE — 99395 PREV VISIT EST AGE 18-39: CPT | Performed by: NURSE PRACTITIONER

## 2022-06-14 ASSESSMENT — ENCOUNTER SYMPTOMS
EYE DISCHARGE: 0
SHORTNESS OF BREATH: 0
ABDOMINAL PAIN: 0
CONSTIPATION: 0
NAUSEA: 0
DIARRHEA: 0
WHEEZING: 0
VOMITING: 0
COUGH: 0
BLOOD IN STOOL: 0

## 2022-06-14 NOTE — PATIENT INSTRUCTIONS
Trial of zyrtec at bedtime  Continue to use saline as needed   Follow up with pulmonology as planned

## 2022-06-14 NOTE — PROGRESS NOTES
Well Adult Note  Name: Honey Sumner Date: 2022   MRN: [id number] Sex: Male   Age: 31 y.o. 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 He has been a  for the past 6 years. He does have some accumulation of drainage that causes him to feel a lung congestion. He has an appointment next month for evaluation of lungs. He does states that when he feels the congestion and uses simply saline spray it immediately clears. History:  HX of urethral stricture-      Review of Systems   Constitutional: Negative for fever. HENT: Negative for congestion. Eyes: Negative for discharge. Respiratory: Negative for cough, shortness of breath and wheezing. Cardiovascular: Negative for chest pain, palpitations and leg swelling. Gastrointestinal: Negative for abdominal pain, blood in stool, constipation, diarrhea, nausea and vomiting. Genitourinary: Negative for dysuria and hematuria. Musculoskeletal: Negative for myalgias. Skin: Negative for rash. Neurological: Negative for dizziness. Psychiatric/Behavioral: The patient is not nervous/anxious.         Allergies   Allergen Reactions    Cefdinir      GI issues    Erythromycin Swelling         Prior to Visit Medications    Not on File           Past Surgical History:   Procedure Laterality Date    MOUTH SURGERY  2018    WISDOM TOOTH EXTRACTION           Family History   Problem Relation Age of Onset    Diabetes Father     Heart Disease Maternal Grandmother     Cancer Maternal Grandmother     Cancer Paternal Grandmother     Heart Disease Paternal Grandmother     Stroke Paternal Grandfather     Cancer Maternal Cousin        Social History     Tobacco Use    Smoking status: Former Smoker     Packs/day: 1.00     Years: 10.00     Pack years: 10.00     Types: Cigarettes Start date:      Quit date: 3/1/2016     Years since quittin.2    Smokeless tobacco: Former User     Quit date: 3/7/2018   Vaping Use    Vaping Use: Never used   Substance Use Topics    Alcohol use: No     Alcohol/week: 0.0 standard drinks    Drug use: No       Objective   /68   Pulse 58   Temp 98.4 °F (36.9 °C)   Ht 5' 11\" (1.803 m)   Wt 148 lb (67.1 kg)   SpO2 96%   BMI 20.64 kg/m²   Wt Readings from Last 3 Encounters:   22 148 lb (67.1 kg)   22 153 lb (69.4 kg)   22 155 lb 1.6 oz (70.4 kg)           Physical Exam  Constitutional:       General: He is not in acute distress. Appearance: He is not diaphoretic. HENT:      Right Ear: External ear normal.      Left Ear: External ear normal.      Mouth/Throat:      Pharynx: No oropharyngeal exudate. Eyes:      General: No scleral icterus. Right eye: No discharge. Left eye: No discharge. Neck:      Thyroid: No thyromegaly. Cardiovascular:      Rate and Rhythm: Normal rate and regular rhythm. Heart sounds: Normal heart sounds. No murmur heard. No friction rub. No gallop. Pulmonary:      Effort: Pulmonary effort is normal.      Breath sounds: Normal breath sounds. No wheezing. Abdominal:      General: Bowel sounds are normal. There is no distension. Palpations: Abdomen is soft. Tenderness: There is no abdominal tenderness. Musculoskeletal:         General: No tenderness. Normal range of motion. Cervical back: Normal range of motion. Lymphadenopathy:      Cervical: No cervical adenopathy. Skin:     General: Skin is warm and dry. Findings: No erythema or rash. Neurological:      General: No focal deficit present. Mental Status: He is alert and oriented to person, place, and time. Cranial Nerves: No cranial nerve deficit (CN 2-12 intact). Psychiatric:         Judgment: Judgment normal.           Assessment   Plan   1.  Encounter for well adult exam without abnormal findings  Work form filled out, scanned to chart with original back to patient    Trial of zyrtec at  to reduce mucus  Continue simply saline spray  FU with pulmonology for lung evaluation as planned         Personalized Preventive Plan   Current Health Maintenance Status  Immunization History   Administered Date(s) Administered    DTaP (Infanrix) 01/14/1988, 04/08/1988, 06/28/1988, 05/10/1989    DTaP, 5 Pertussis Antigens (Daptacel) 12/05/1997, 03/28/2003    Hib PRP-OMP (PedvaxHIB) 05/06/1989    Influenza Virus Vaccine 10/01/2020    MMR 05/22/1989, 12/05/1997    Polio IPV (IPOL) 01/14/1988, 04/08/1988, 05/16/1989, 11/11/1992    Td vaccine (adult) 11/11/1992    Td, unspecified formulation 11/11/1992    Tdap (Boostrix, Adacel) 10/24/2012        Health Maintenance   Topic Date Due    COVID-19 Vaccine (1) Never done    Flu vaccine (Season Ended) 09/01/2022    DTaP/Tdap/Td vaccine (8 - Td or Tdap) 10/24/2022    Depression Screen  04/27/2023    Hib vaccine  Completed    Hepatitis C screen  Completed    HIV screen  Completed    Hepatitis A vaccine  Aged Out    Hepatitis B vaccine  Aged Out    Meningococcal (ACWY) vaccine  Aged Out    Pneumococcal 0-64 years Vaccine  Aged Out    Varicella vaccine  Discontinued     Recommendations for [employer] Due: see orders and patient instructions/AVS.    No follow-ups on file.

## 2022-06-17 ENCOUNTER — TELEPHONE (OUTPATIENT)
Dept: PULMONOLOGY | Age: 37
End: 2022-06-17

## 2022-06-17 NOTE — TELEPHONE ENCOUNTER
FYI    Patient had been seen by Dr. Alexia Hernadez in 2016. He had made an appt for July, but calls in with increasing symptoms of chest pressure. He states it is not radiating, and that he is able to run as he usually does, and that his O2 sats are in high 90's,  but he wants to be seen sooner than the July appt. He is a  has not been able to work recently due to these symptoms. Appt was moved to 06/21/2022 with Dr. Yrn Pressley.

## 2022-06-21 ENCOUNTER — OFFICE VISIT (OUTPATIENT)
Dept: PULMONOLOGY | Age: 37
End: 2022-06-21
Payer: COMMERCIAL

## 2022-06-21 VITALS
WEIGHT: 150 LBS | DIASTOLIC BLOOD PRESSURE: 75 MMHG | TEMPERATURE: 96.6 F | SYSTOLIC BLOOD PRESSURE: 126 MMHG | HEIGHT: 71 IN | HEART RATE: 105 BPM | BODY MASS INDEX: 21 KG/M2 | OXYGEN SATURATION: 99 %

## 2022-06-21 DIAGNOSIS — R07.89 CHEST TIGHTNESS: Primary | ICD-10-CM

## 2022-06-21 PROCEDURE — G8427 DOCREV CUR MEDS BY ELIG CLIN: HCPCS | Performed by: INTERNAL MEDICINE

## 2022-06-21 PROCEDURE — G8420 CALC BMI NORM PARAMETERS: HCPCS | Performed by: INTERNAL MEDICINE

## 2022-06-21 PROCEDURE — 99204 OFFICE O/P NEW MOD 45 MIN: CPT | Performed by: INTERNAL MEDICINE

## 2022-06-21 PROCEDURE — 1036F TOBACCO NON-USER: CPT | Performed by: INTERNAL MEDICINE

## 2022-06-21 RX ORDER — ALBUTEROL SULFATE 90 UG/1
2 AEROSOL, METERED RESPIRATORY (INHALATION) EVERY 4 HOURS PRN
Qty: 18 G | Refills: 0 | Status: SHIPPED | OUTPATIENT
Start: 2022-06-21 | End: 2022-08-02

## 2022-06-21 ASSESSMENT — ENCOUNTER SYMPTOMS
COUGH: 1
CHEST TIGHTNESS: 1
WHEEZING: 0

## 2022-06-21 NOTE — PROGRESS NOTES
[facility] Pulmonary and Critical Care    Outpatient Note    Subjective:   CHIEF COMPLAINT: No chief complaint on file. HPI:     The patient is 31 y.o. male who presents today for a new patient visit for evaluation of chest tightness. This problem has been going on intermittently since Feb 2020. He describes it as chest tightness, as someone is pushing on the chest.  Of note exercise makes it better. He is a runner. He has post nasal drip with viscus secretions all the time. Sometimes more noticeable that others. .    There is no wheezing. He had few episodes of lightheadedness. He gets anxious during these episodes. He describes it as if there is a bag on his mouth. He is a  over the past 3 years as part time and full time college student. He is also employed at the [employer]. He can run 6 miles in 49 minutes. Resting heart rate is 47. Last episode lasted from Friday to Friday. It is not the whole time. There is no issues at night. It usually happen on the off days. He has hx of moderate anxiety. He is more stressed lately. He describes 2 different sensation. One similar to when he was a child with pressure, nasal congestion and tightness. 2nd sensation is like a throat tickle. It happen when running on a track and when picking up leaves. It is associated with cough as if he is trying to clear his airway. One day he had chest pain but usually it is not. One time he had palpitations. There is no radiation to the neck, back, shoulders or arms  Does not have any wheezing. He quit smoking in 2016    No family hx of asthma, eczema or allergies. No hx of premature CAD. Past Medical History:    History reviewed. No pertinent past medical history.     Social History:    Social History     Tobacco Use   Smoking Status Former Smoker    Packs/day: 1.00    Years: 10.00    Pack years: 10.00    Types: Cigarettes    Start date: 2005    Quit date: 3/1/2016    Years since quittin.3   Smokeless Tobacco Former User    Quit date: 3/7/2018       Family History:  Family History   Problem Relation Age of Onset    Diabetes Father     Heart Disease Maternal Grandmother     Cancer Maternal Grandmother     Cancer Paternal Grandmother     Heart Disease Paternal Grandmother     Stroke Paternal Grandfather     Cancer Maternal Cousin        Current Medications:  No current outpatient medications on file prior to visit. No current facility-administered medications on file prior to visit. REVIEW OF SYSTEMS:    Review of Systems   Constitutional: Negative for chills, fatigue, fever and unexpected weight change. HENT: Positive for congestion. Respiratory: Positive for cough and chest tightness. Negative for wheezing. Cardiovascular: Positive for palpitations. Negative for leg swelling. Neurological: Negative for weakness. Psychiatric/Behavioral: The patient is nervous/anxious. All other systems reviewed and are negative. Objective:   PHYSICAL EXAM:        VITALS:  /75 (Site: Right Upper Arm, Position: Sitting, Cuff Size: Medium Adult)   Pulse (!) 105   Temp (!) 96.6 °F (35.9 °C) (Infrared)   Ht 5' 11\" (1.803 m)   Wt 150 lb (68 kg)   SpO2 99%   BMI 20.92 kg/m²     Physical Exam  Vitals reviewed. Constitutional:       Appearance: He is well-developed. HENT:      Head: Normocephalic and atraumatic. Eyes:      Pupils: Pupils are equal, round, and reactive to light. Neck:      Vascular: No JVD. Cardiovascular:      Rate and Rhythm: Normal rate and regular rhythm. Heart sounds: No murmur heard. Pulmonary:      Effort: Pulmonary effort is normal. No respiratory distress. Breath sounds: Normal breath sounds. No stridor. No wheezing or rales. Abdominal:      General: Bowel sounds are normal.      Palpations: Abdomen is soft. Musculoskeletal:         General: No deformity. Cervical back: Neck supple. Skin:     General: Skin is warm and dry. Neurological:      Mental Status: He is alert and oriented to person, place, and time. Psychiatric:         Behavior: Behavior normal.         DATA:    CXR on 3/1/16  IMPRESSION:     Mild hyperinflation lungs. Assessment:      Diagnosis Orders   1. Chest tightness         Plan:   31year old male with recurrent episodes of chest tightness, it usually lasts for few days at a time but not all the time. It can be with or without exertion. He is also complaining of occasional tickle sensation with cough as if he is trying to clear his throat. It happened few times while running on a track or cleaning leaves outside. He has excellent functional capacity. Runs 6 miles in 49 minutes. He had kind a similar sensation when he saw Dr Taylor Tubbs in 2016. At that time he had fever though. He quit smoking since. He is under a lot of stress. He is a part time , works in the [employer] and full time student. Symptoms happen when he is off work and does not happen at night. These episodes are unlikely ischemic as it doesn't appear to happen with strenuous exercise, and it is not consistent, no family hx of premature CAD. He is young and physically fit. Anxiety is definitely playing a role here, as these episodes predominantly during off days and doesn't happen at night. It is happening when he is under stress. However this tickle sensation with cough with this hx, he may have bronchospasm on top of anxiety. Prescribed him albuterol trial, to be used when having this \"tickle sensation\", and before exertion. Will reassess in 4 weeks. If no improvement methacholine challenge test can be considered.

## 2022-07-19 ENCOUNTER — TELEPHONE (OUTPATIENT)
Dept: PULMONOLOGY | Age: 37
End: 2022-07-19

## 2022-07-19 ENCOUNTER — OFFICE VISIT (OUTPATIENT)
Dept: PULMONOLOGY | Age: 37
End: 2022-07-19
Payer: COMMERCIAL

## 2022-07-19 VITALS
DIASTOLIC BLOOD PRESSURE: 77 MMHG | HEART RATE: 83 BPM | TEMPERATURE: 96.6 F | BODY MASS INDEX: 21.14 KG/M2 | SYSTOLIC BLOOD PRESSURE: 125 MMHG | OXYGEN SATURATION: 100 % | HEIGHT: 71 IN | WEIGHT: 151 LBS

## 2022-07-19 DIAGNOSIS — R06.02 SOB (SHORTNESS OF BREATH): Primary | ICD-10-CM

## 2022-07-19 PROCEDURE — G8420 CALC BMI NORM PARAMETERS: HCPCS | Performed by: INTERNAL MEDICINE

## 2022-07-19 PROCEDURE — 1036F TOBACCO NON-USER: CPT | Performed by: INTERNAL MEDICINE

## 2022-07-19 PROCEDURE — 99212 OFFICE O/P EST SF 10 MIN: CPT | Performed by: INTERNAL MEDICINE

## 2022-07-19 PROCEDURE — G8427 DOCREV CUR MEDS BY ELIG CLIN: HCPCS | Performed by: INTERNAL MEDICINE

## 2022-07-19 ASSESSMENT — ENCOUNTER SYMPTOMS
WHEEZING: 0
CHEST TIGHTNESS: 0
COUGH: 0

## 2022-07-19 NOTE — PROGRESS NOTES
[facility] Pulmonary and Critical Care    Outpatient Note    Subjective:   CHIEF COMPLAINT: No chief complaint on file. Interval history on 7/19/22  He did not use albuterol. He felt he didn't need it. About 1.5 weeks ago he had minor SOB. It did not disrupt his activity. He associate left nasal blockage with SOB. He use saline nasal spray which helps with SOB. Stress is much better. HPI:     The patient is 31 y.o. male who presents today for a new patient visit for evaluation of chest tightness. This problem has been going on intermittently since Feb 2020. He describes it as chest tightness, as someone is pushing on the chest.  Of note exercise makes it better. He is a runner. He has post nasal drip with viscus secretions all the time. Sometimes more noticeable that others. .    There is no wheezing. He had few episodes of lightheadedness. He gets anxious during these episodes. He describes it as if there is a bag on his mouth. He is a  over the past 3 years as part time and full time college student. He is also employed at the [employer] office. He can run 6 miles in 49 minutes. Resting heart rate is 47. Last episode lasted from Friday to Friday. It is not the whole time. There is no issues at night. It usually happen on the off days. He has hx of moderate anxiety. He is more stressed lately. He describes 2 different sensation. One similar to when he was a child with pressure, nasal congestion and tightness. 2nd sensation is like a throat tickle. It happen when running on a track and when picking up leaves. It is associated with cough as if he is trying to clear his airway. One day he had chest pain but usually it is not. One time he had palpitations. There is no radiation to the neck, back, shoulders or arms  Does not have any wheezing. He quit smoking in 2016    No family hx of asthma, eczema or allergies.     No hx of premature CAD. Past Medical History:    No past medical history on file. Social History:    Social History     Tobacco Use   Smoking Status Former    Packs/day: 1.00    Years: 10.00    Pack years: 10.00    Types: Cigarettes    Start date:     Quit date: 3/1/2016    Years since quittin.3   Smokeless Tobacco Former    Quit date: 3/7/2018       Family History:  Family History   Problem Relation Age of Onset    Diabetes Father     Heart Disease Maternal Grandmother     Cancer Maternal Grandmother     Cancer Paternal Grandmother     Heart Disease Paternal Grandmother     Stroke Paternal Grandfather     Cancer Maternal Cousin        Current Medications:  Current Outpatient Medications on File Prior to Visit   Medication Sig Dispense Refill    albuterol sulfate HFA (VENTOLIN HFA) 108 (90 Base) MCG/ACT inhaler Inhale 2 puffs into the lungs every 4 hours as needed for Wheezing 18 g 0     No current facility-administered medications on file prior to visit. REVIEW OF SYSTEMS:    Review of Systems   Constitutional:  Negative for chills, fatigue, fever and unexpected weight change. HENT:  Negative for congestion. Respiratory:  Negative for cough, chest tightness and wheezing. Cardiovascular:  Negative for palpitations and leg swelling. Neurological:  Negative for weakness. Psychiatric/Behavioral:  The patient is not nervous/anxious. All other systems reviewed and are negative. Objective:   PHYSICAL EXAM:        VITALS:  /77 (Site: Left Upper Arm, Position: Sitting, Cuff Size: Medium Adult)   Pulse 83   Temp (!) 96.6 °F (35.9 °C) (Infrared)   Ht 5' 11\" (1.803 m)   Wt 151 lb (68.5 kg)   SpO2 100%   BMI 21.06 kg/m²     Physical Exam  Vitals reviewed. Constitutional:       Appearance: He is well-developed. HENT:      Head: Normocephalic and atraumatic. Eyes:      Pupils: Pupils are equal, round, and reactive to light. Neck:      Vascular: No JVD.    Cardiovascular: Rate and Rhythm: Normal rate and regular rhythm. Heart sounds: No murmur heard. Pulmonary:      Effort: Pulmonary effort is normal. No respiratory distress. Breath sounds: Normal breath sounds. No stridor. No wheezing or rales. Abdominal:      General: Bowel sounds are normal.      Palpations: Abdomen is soft. Musculoskeletal:         General: No deformity. Cervical back: Neck supple. Skin:     General: Skin is warm and dry. Neurological:      Mental Status: He is alert and oriented to person, place, and time. Psychiatric:         Behavior: Behavior normal.       DATA:    CXR on 3/1/16  IMPRESSION:     Mild hyperinflation lungs. Assessment:      Diagnosis Orders   1. SOB (shortness of breath)            Plan:   31year old male with recurrent episodes of chest tightness, it usually lasts for few days at a time but not all the time. It can be with or without exertion. He is also complaining of occasional tickle sensation with cough as if he is trying to clear his throat. It happened few times while running on a track or cleaning leaves outside. He has excellent functional capacity. Runs 5 miles in 41 minutes. He had kind a similar sensation when he saw Dr Bhavna Hunter in 2016. At that time he had fever though. He quit smoking since. However he chew tobacco.    He was  under a lot of stress. He is a part time , works in the [employer] and full time student. Symptoms happen when he is off work and does not happen at night. These episodes are unlikely ischemic as it doesn't appear to happen with strenuous exercise, and it is not consistent, no family hx of premature CAD. He is young and physically fit. Stress level is better and since his symptoms are better. He did not have to use albuterol. However he had minor episode of SOB while having nasal congestion. Tobacco makes nasal drip worse.       No further pulmonary workup is needed at this time.    If nasal congestion remain an issue he may try flonase.    Counseled to quit tobacco use

## 2022-07-19 NOTE — TELEPHONE ENCOUNTER
Patient needs a brief note for [employer] that he does not need to use the inhaler. They will not qualify him if he is in need of an inhaler.      Once written and signed, I will call pt and he will come by the office to pick it up    Thank you

## 2022-08-02 ENCOUNTER — OFFICE VISIT (OUTPATIENT)
Dept: INTERNAL MEDICINE CLINIC | Age: 37
End: 2022-08-02
Payer: COMMERCIAL

## 2022-08-02 VITALS
HEART RATE: 71 BPM | TEMPERATURE: 98.6 F | DIASTOLIC BLOOD PRESSURE: 78 MMHG | OXYGEN SATURATION: 99 % | WEIGHT: 143 LBS | BODY MASS INDEX: 19.94 KG/M2 | SYSTOLIC BLOOD PRESSURE: 117 MMHG

## 2022-08-02 DIAGNOSIS — R07.89 OTHER CHEST PAIN: Primary | ICD-10-CM

## 2022-08-02 PROCEDURE — 99214 OFFICE O/P EST MOD 30 MIN: CPT | Performed by: NURSE PRACTITIONER

## 2022-08-02 PROCEDURE — G8420 CALC BMI NORM PARAMETERS: HCPCS | Performed by: NURSE PRACTITIONER

## 2022-08-02 PROCEDURE — G8427 DOCREV CUR MEDS BY ELIG CLIN: HCPCS | Performed by: NURSE PRACTITIONER

## 2022-08-02 PROCEDURE — 1036F TOBACCO NON-USER: CPT | Performed by: NURSE PRACTITIONER

## 2022-08-02 ASSESSMENT — ENCOUNTER SYMPTOMS
NAUSEA: 0
WHEEZING: 0
CONSTIPATION: 0
DIARRHEA: 0
VOMITING: 0
COUGH: 0
SHORTNESS OF BREATH: 0
BLOOD IN STOOL: 0
EYE DISCHARGE: 0
ABDOMINAL PAIN: 0

## 2022-08-02 NOTE — PROGRESS NOTES
22    Bo Arthur  31 y.o.  male      Chief Complaint   Patient presents with    Anxiety         HPI:   Pt presents with complaint that yesterday he felt a sharp pain in left chest, rated 5/10, that lasted for 5 min. His heart rate accelerated to 149, his hands were cold and clammy. He became very anxious that he was having a heart problem. Heart rate slowly, steadily returned to normal.  He exercises strenuously and has no problems during exercise. He also states that pain was worsened by pressing directly on ribs. He has had anxiety in the past but is on no medication at this time. He was evaluated by pulmonology with unremarkable eval and dx with anxiety. He states he feels fine today. 1. Other chest pain  To relieve his anxiety that this is a heart problem will refer for cardiac workup  - Tomas Mason  If work up is negative discussed the need for anti anxiety medication and counseling    /78   Pulse 71   Temp 98.6 °F (37 °C)   Wt 143 lb (64.9 kg)   SpO2 99%   BMI 19.94 kg/m²     No current outpatient medications on file. No current facility-administered medications for this visit.        Social History     Socioeconomic History    Marital status: Single     Spouse name: Not on file    Number of children: Not on file    Years of education: Not on file    Highest education level: Not on file   Occupational History    Not on file   Tobacco Use    Smoking status: Former     Packs/day: 1.00     Years: 10.00     Pack years: 10.00     Types: Cigarettes     Start date:      Quit date: 3/1/2016     Years since quittin.4    Smokeless tobacco: Former     Quit date: 3/7/2018   Vaping Use    Vaping Use: Never used   Substance and Sexual Activity    Alcohol use: No     Alcohol/week: 0.0 standard drinks    Drug use: No    Sexual activity: Yes     Partners: Female   Other Topics Concern    Not on file   Social History Narrative    Not on file     Social Determinants of Health     Financial Resource Strain: Low Risk     Difficulty of Paying Living Expenses: Not hard at all   Food Insecurity: No Food Insecurity    Worried About Running Out of Food in the Last Year: Never true    Ran Out of Food in the Last Year: Never true   Transportation Needs: Not on file   Physical Activity: Not on file   Stress: Not on file   Social Connections: Not on file   Intimate Partner Violence: Not on file   Housing Stability: Not on file       Family History   Problem Relation Age of Onset    Diabetes Father     Heart Disease Maternal Grandmother     Cancer Maternal Grandmother     Cancer Paternal Grandmother     Heart Disease Paternal Grandmother     Stroke Paternal Grandfather     Cancer Maternal Cousin        No past medical history on file. Review of Systems   Constitutional:  Negative for fever. HENT:  Negative for congestion. Eyes:  Negative for discharge. Respiratory:  Negative for cough, shortness of breath and wheezing. Cardiovascular:  Negative for chest pain, palpitations and leg swelling. Gastrointestinal:  Negative for abdominal pain, blood in stool, constipation, diarrhea, nausea and vomiting. Genitourinary:  Negative for dysuria and hematuria. Musculoskeletal:  Negative for myalgias. Skin:  Negative for rash. Neurological:  Negative for dizziness. Psychiatric/Behavioral:  The patient is not nervous/anxious. Physical Exam  Constitutional:       General: He is not in acute distress. Appearance: He is not diaphoretic. HENT:      Right Ear: External ear normal.      Left Ear: External ear normal.      Mouth/Throat:      Pharynx: No oropharyngeal exudate. Eyes:      General: No scleral icterus. Right eye: No discharge. Left eye: No discharge. Neck:      Thyroid: No thyromegaly. Cardiovascular:      Rate and Rhythm: Normal rate and regular rhythm. Heart sounds: Normal heart sounds. No murmur heard.     No friction rub. No gallop. Pulmonary:      Effort: Pulmonary effort is normal.      Breath sounds: Normal breath sounds. No wheezing. Abdominal:      General: Bowel sounds are normal. There is no distension. Palpations: Abdomen is soft. Tenderness: There is no abdominal tenderness. Musculoskeletal:         General: No tenderness. Normal range of motion. Cervical back: Normal range of motion. Lymphadenopathy:      Cervical: No cervical adenopathy. Skin:     General: Skin is warm and dry. Findings: No erythema or rash. Neurological:      Mental Status: He is alert and oriented to person, place, and time.    Psychiatric:         Judgment: Judgment normal.              Saad Cheema, APRN - CNP

## 2022-08-04 RX ORDER — NABUMETONE 750 MG/1
750 TABLET, FILM COATED ORAL 2 TIMES DAILY
Qty: 60 TABLET | Refills: 0 | Status: SHIPPED | OUTPATIENT
Start: 2022-08-04

## 2022-08-04 NOTE — PROGRESS NOTES
Will trial nabumetone for possible costochondritis. Take with food, twice a day. If pain gets worse or there is SOB go to the ER.

## 2022-08-10 ENCOUNTER — TELEPHONE (OUTPATIENT)
Dept: PULMONOLOGY | Age: 37
End: 2022-08-10

## 2022-08-10 ENCOUNTER — TELEPHONE (OUTPATIENT)
Dept: INTERNAL MEDICINE CLINIC | Age: 37
End: 2022-08-10

## 2022-08-10 DIAGNOSIS — R05.9 COUGH: Primary | ICD-10-CM

## 2022-08-10 DIAGNOSIS — J93.11 PRIMARY SPONTANEOUS PNEUMOTHORAX: Primary | ICD-10-CM

## 2022-08-10 NOTE — TELEPHONE ENCOUNTER
Patient calling today stating he is feeling better since his appointment last week. Though he states he is still having symptoms of coughing when he bends over and stands back up and when he goes up and down the stairs. He states he has a dry, nonproductive cough. When he walks about 10 feet he starts to cough. He has no SOB. Patient states he feels as if there is sloshing in his chest wall, he reports this feeling is sometimes in the front and sometimes in the back. He states he doesn't have constant pain, but when he does have pain it is described as stabbing under his rib area. He is wondering if he should have some labs or imaging done. Patient has an appointment with cardiology on 9/21.       Please advise

## 2022-08-11 ENCOUNTER — HOSPITAL ENCOUNTER (OUTPATIENT)
Dept: GENERAL RADIOLOGY | Age: 37
Discharge: HOME | End: 2022-08-11
Payer: COMMERCIAL

## 2022-08-11 DIAGNOSIS — R05.9 COUGH: Primary | ICD-10-CM

## 2022-08-11 DIAGNOSIS — R05.9 COUGH: ICD-10-CM

## 2022-08-11 PROCEDURE — 71046 X-RAY EXAM CHEST 2 VIEWS: CPT

## 2022-08-11 NOTE — TELEPHONE ENCOUNTER
I called him  Chest pain is non specific. It lasts for days. It starts on the left side above the nipple and radiate to the neck. He is also complaining of cough and \"popping sound\". Will get CXR.   Please schedule a f/u this week or next week    Thanks

## 2022-08-12 ENCOUNTER — HOSPITAL ENCOUNTER (OUTPATIENT)
Dept: GENERAL RADIOLOGY | Age: 37
Discharge: HOME | End: 2022-08-12
Payer: COMMERCIAL

## 2022-08-12 DIAGNOSIS — J93.11 PRIMARY SPONTANEOUS PNEUMOTHORAX: ICD-10-CM

## 2022-08-12 DIAGNOSIS — J93.11 PRIMARY SPONTANEOUS PNEUMOTHORAX: Primary | ICD-10-CM

## 2022-08-12 PROCEDURE — 71046 X-RAY EXAM CHEST 2 VIEWS: CPT

## 2022-08-12 NOTE — TELEPHONE ENCOUNTER
2nd perfect serve message sent to Dr. Argenis Gold to call pt. Pt has called back and has LOTS of questions I am unable to answer.

## 2022-08-17 ENCOUNTER — TELEPHONE (OUTPATIENT)
Dept: PULMONOLOGY | Age: 37
End: 2022-08-17

## 2022-08-17 DIAGNOSIS — R06.02 SOB (SHORTNESS OF BREATH): Primary | ICD-10-CM

## 2022-08-17 NOTE — TELEPHONE ENCOUNTER
Please call patient   Having chest tightness and some shortness of breath(no pain)  When he clears his throat his breathing gets better   His o2 stats are in the high 90's-- was 97% at this time of the call  Reggie kaitlyn romo  Can be reached at [phone]

## 2022-08-19 ENCOUNTER — HOSPITAL ENCOUNTER (OUTPATIENT)
Dept: CT IMAGING | Age: 37
Discharge: HOME | End: 2022-08-19
Payer: COMMERCIAL

## 2022-08-19 DIAGNOSIS — J93.11 PRIMARY SPONTANEOUS PNEUMOTHORAX: ICD-10-CM

## 2022-08-19 PROCEDURE — 71250 CT THORAX DX C-: CPT

## 2022-08-24 ENCOUNTER — TELEPHONE (OUTPATIENT)
Dept: INTERNAL MEDICINE CLINIC | Age: 37
End: 2022-08-24

## 2022-08-24 NOTE — TELEPHONE ENCOUNTER
Patient calling asking if he can have a referral for the asthma and allergy specialist.     Patient has been seen in office for chest tightness, post nasal drip and SOB. He is asking for this referral so he can get an allergy test to see what is causing these issues.      [phone] call back

## 2022-08-26 NOTE — TELEPHONE ENCOUNTER
Called the patient he is supposed to be calling his insurance to see who they will cover for him to see.

## 2022-08-29 ENCOUNTER — TELEPHONE (OUTPATIENT)
Dept: PULMONOLOGY | Age: 37
End: 2022-08-29

## 2022-08-29 NOTE — TELEPHONE ENCOUNTER
Radha Silva is calling to let you know he used on puff of albuterol yesterday after washing his car and his throat started to tickle. Wants to know is this normal or can he try another inhaler?      Can be reached at [phone]

## 2022-08-30 ENCOUNTER — TELEPHONE (OUTPATIENT)
Dept: INTERNAL MEDICINE CLINIC | Age: 37
End: 2022-08-30

## 2022-08-30 NOTE — LETTER
[facility] [address]  Phone: [phone]  Fax: [phone]    NIKI Jenkins CNP          August 30, 2022     Dr. Magdi Espana      Patient: Priscilla Talbot   MR Number: [id number]   YOB: 1985   Date of Visit: 8/30/2022       Dear Dr. Josr Melchor:    I am requesting a consultation of my patient Magdi Espana, to you for evaluation of asthma symptoms after pneumothorax. I appreciate your assistance in his care and look forward to your findings and recommendations.     Sincerely,                           NIKI Jenkins CNP

## 2022-08-30 NOTE — TELEPHONE ENCOUNTER
Patient calling asking for a referral to a pulmologist at [facility]. The name of the pulmologidt he is wanting to see is Reuben Eligio, to get a 2     Patient wanted to note that he had a spontaneous pneumothorax on 8/1/2022 ans is now having asthma symptoms.        Call back number     Fax [phone] [facility]

## 2022-09-01 NOTE — TELEPHONE ENCOUNTER
I called him. He used albuterol 3-4 last week. However he didn't use it the month before. Plan to continue albuterol on PRN basis. Will consider methacholine challenge in the next 2-3 months  Please schedule f/u in 2 months.       Thanks

## 2022-09-20 NOTE — PROGRESS NOTES
(03/2018). Social History:   reports that he quit smoking about 6 years ago. His smoking use included cigarettes. He started smoking about 17 years ago. He has a 10.00 pack-year smoking history. He quit smokeless tobacco use about 4 years ago. He reports that he does not drink alcohol and does not use drugs. Family History:  Family History   Problem Relation Age of Onset    Diabetes Father     Heart Disease Maternal Grandmother     Cancer Maternal Grandmother     Cancer Paternal Grandmother     Heart Disease Paternal Grandmother     Stroke Paternal Grandfather     Cancer Maternal Cousin    Grandmother - Mitral valve disease  Uncle - Mitral valve disease      Home Medications:  Reviewed and are listed in nursing record. and/or listed below  Current Outpatient Medications   Medication Sig Dispense Refill    ASMANEX, 30 METERED DOSES, 110 MCG/INH AEPB       nabumetone (RELAFEN) 750 MG tablet Take 1 tablet by mouth in the morning and 1 tablet before bedtime. (Patient not taking: Reported on 9/21/2022) 60 tablet 0     No current facility-administered medications for this visit. Allergies:  Cefdinir and Erythromycin     Review of Systems:   Review of Systems   Constitutional:  Negative for chills and fever. HENT:  Negative for congestion, rhinorrhea and sore throat. Eyes:  Negative for photophobia, pain and visual disturbance. Respiratory:  Positive for cough and shortness of breath. Cardiovascular:  Positive for chest pain and palpitations. Negative for leg swelling. Gastrointestinal:  Negative for abdominal pain, blood in stool, constipation, diarrhea, nausea and vomiting. Endocrine: Negative for cold intolerance and heat intolerance. Genitourinary:  Negative for difficulty urinating, dysuria and hematuria. Musculoskeletal:  Negative for arthralgias, joint swelling and myalgias. Skin:  Negative for rash and wound.    Allergic/Immunologic: Negative for environmental allergies and food allergies. Neurological:  Negative for dizziness, syncope and light-headedness. Hematological:  Negative for adenopathy. Does not bruise/bleed easily. Psychiatric/Behavioral:  Negative for dysphoric mood. The patient is nervous/anxious. Objective:   PHYSICAL EXAM:    Vitals:    09/21/22 1249 09/21/22 1304 09/21/22 1305   BP: 120/72 126/82 (!) 140/86   Site: Left Upper Arm Left Upper Arm Left Upper Arm   Position: Supine Sitting Standing   Pulse: (!) 101 91 (!) 120   SpO2: 98% 98% 99%   Weight: 151 lb (68.5 kg)     Height: 5' 11\" (1.803 m)        Weight: 151 lb (68.5 kg)     Wt Readings from Last 3 Encounters:   09/21/22 151 lb (68.5 kg)   08/02/22 143 lb (64.9 kg)   07/19/22 151 lb (68.5 kg)     General: Anxious adult male  HEENT: Normocephalic, atraumatic, non-icteric, hearing intact, nares normal, mucous membranes moist.  Neck: Supple, trachea midline. No adenopathy. No thyromegaly. No carotid bruits. No JVD. Heart: Regular rate and rhythm. Normal S1 and S2. No murmurs, gallops or rubs. Chest: Mild pectus excavatum noted. Lungs: Normal respiratory effort. Clear to auscultation bilaterally. No wheezes, rales, or rhonchi. Abdomen: Soft, non-tender. Normoactive bowel sounds. No masses or organomegaly. Skin: No rashes, wounds, or lesions. Pulses: 2+ and symmetric. Extremities: No clubbing, cyanosis, or edema. Musculoskeletal: 5/5 strength in upper and lower extremities. Psych: Normal mood and affect. Neuro: Alert and oriented to person, place, and time. No focal deficits noted.       LABS   CBC:    No results found for: WBC, RBC, HGB, HCT, MCV, RDW, PLT  CMP:  No results found for: NA, K, CL, CO2, BUN, CREATININE, GFRAA, AGRATIO, LABGLOM, GLUCOSE, PROT, CALCIUM, BILITOT, ALKPHOS, AST, ALT  PT/INR:   No results found for: PTINR  Liver:  No components found for: CHLPL  No results found for: ALT, AST, GGT, ALKPHOS, BILITOT  No results found for: LABA1C  Lipids:       No results found for: TRIG No results found for: HDL       No results found for: [address]       No results found for: LABVLDL    Labs reviewed from [facility] from 9/2022    CARDIAC DATA   LAST EKG 9/21/22:  Normal sinus rhythm, left axis deviation, left anterior fascicular block    ECHO: N/A    STRESS TEST: N/A    CARDIAC CATH: N/A    VASCULAR/OTHER IMAGING:  Chest X-ray 8/11/22:  Impression   Small left-sided hydropneumothorax. CT Chest 8/19/2022:     Impression       Small left pneumothorax. Chest X-ray 9/8/22:  FINDINGS: PA and lateral radiographs of the chest were obtained. The heart and mediastinum are within normal limits for size and configuration. No infiltrate, effusion or pneumothorax is identified. There is some mild pectus excavatum deformity. Assessment:     1. Atypical chest pain - Patient describes multiple different episodes of chest pain that were likely attributable to his recent pneumothorax and suspected asthma. There may be a component of muscular chest wall pain and his anxiety may be further exacerbating some of his symptoms as well. More recently, his chest pain seems to have improved significantly, a CXR obtained earlier this month showed resolution of his pneumothorax, and his asthma is better controlled on his current inhalers. Overall, the chest pain described is very atypical for angina and the patient reports excellent exercise tolerance prior to being diagnosed with a pneumothorax. His EKG shows normal sinus rhythm with left anterior fascicular block and left axis deviation. He has mild pectus excavatum, but this is felt to be a less likely contributor to his symptoms at this time. Will obtain TTE for complete assessment of his cardiac structure and function and to rule out a pericardial effusion. If his echo does not reveal any high risk findings, can likely hold off on additional cardiac evaluation at this time.   2. Palpitations - Seem to be primarily exacerbated by anxiety and not associated with other high-risk features. 3. Lightheadedness - Also suspect primarily provoked by anxiety as above. He did not have any drop in BP during orthostatic vitals obtained today in clinic. He did have a moderate increase in heart rate when going from sitting to standing. If symptoms do not improve with further treatment of his anxiety, can consider additional evaluation for POTS. 4. Left anterior fascicular block  5. Mild pectus excavatum  6. Asthma  7. Former tobacco use  8. Anxiety       Plan:     1. Will obtain TTE for complete assessment of his cardiac structure and function and to rule out a pericardial effusion. If his echo does not reveal any high risk findings, can likely hold off on additional cardiac evaluation at this time. 2. Encouraged patient to make sure that he is staying well hydrated. 3. Recommend patient continue to follow-up with pulmonology for further monitoring and management of his asthma. Agree with plans to obtain PFTs once he has had additional time to recover from his pneumothorax. 4. He may also benefit from further treatment of his anxiety and it is recommended that he follow-up with his PCP for this. 5. Continue to encourage avoidance of tobacco products. 6. Can follow-up with me as needed based on results of TTE. Scribe's attestation: This note was scribed in the presence of Aris Alford DO by Evonne Dawson RN       It is a pleasure to assist in the care of Marita Franco. Please call with any questions. The scribes documentation has been prepared under my direction and personally reviewed by me in its entirety. I confirm that the note above accurately reflects all work, treatment, procedures, and medical decision making performed by me. I, Dr. Priscilla Mancia, personally performed the services described in this documentation as scribed by Evonne Dawson RN in my presence, and it is both accurate and complete to the best of our ability.        Tameka Coello DO Slade Alford [address]  [phone] [facility]  [phone] [facility]

## 2022-09-21 ENCOUNTER — OFFICE VISIT (OUTPATIENT)
Dept: CARDIOLOGY CLINIC | Age: 37
End: 2022-09-21
Payer: COMMERCIAL

## 2022-09-21 VITALS
HEIGHT: 71 IN | OXYGEN SATURATION: 99 % | BODY MASS INDEX: 21.14 KG/M2 | WEIGHT: 151 LBS | DIASTOLIC BLOOD PRESSURE: 86 MMHG | SYSTOLIC BLOOD PRESSURE: 140 MMHG | HEART RATE: 120 BPM

## 2022-09-21 DIAGNOSIS — J93.9 PNEUMOTHORAX, UNSPECIFIED TYPE: ICD-10-CM

## 2022-09-21 DIAGNOSIS — R07.89 ATYPICAL CHEST PAIN: ICD-10-CM

## 2022-09-21 DIAGNOSIS — Z87.891 FORMER TOBACCO USE: ICD-10-CM

## 2022-09-21 DIAGNOSIS — Q67.6 PECTUS EXCAVATUM: ICD-10-CM

## 2022-09-21 DIAGNOSIS — R42 LIGHTHEADEDNESS: ICD-10-CM

## 2022-09-21 DIAGNOSIS — R00.2 PALPITATIONS: Primary | ICD-10-CM

## 2022-09-21 DIAGNOSIS — J45.30 MILD PERSISTENT ASTHMA WITHOUT COMPLICATION: ICD-10-CM

## 2022-09-21 DIAGNOSIS — F41.9 ANXIETY: ICD-10-CM

## 2022-09-21 PROBLEM — J93.83 OTHER PNEUMOTHORAX: Status: ACTIVE | Noted: 2022-09-21

## 2022-09-21 PROBLEM — R06.02 SHORTNESS OF BREATH: Status: ACTIVE | Noted: 2022-09-21

## 2022-09-21 PROCEDURE — 99244 OFF/OP CNSLTJ NEW/EST MOD 40: CPT | Performed by: INTERNAL MEDICINE

## 2022-09-21 PROCEDURE — G8420 CALC BMI NORM PARAMETERS: HCPCS | Performed by: INTERNAL MEDICINE

## 2022-09-21 PROCEDURE — 93000 ELECTROCARDIOGRAM COMPLETE: CPT | Performed by: INTERNAL MEDICINE

## 2022-09-21 PROCEDURE — G8427 DOCREV CUR MEDS BY ELIG CLIN: HCPCS | Performed by: INTERNAL MEDICINE

## 2022-09-21 RX ORDER — MOMETASONE FUROATE 110 UG/1
INHALANT RESPIRATORY (INHALATION)
COMMUNITY
Start: 2022-09-08

## 2022-09-21 NOTE — LETTER
[address] Cardiology - [address]  Phone: [phone]  Fax: [phone]    Kamryn Jenkins DO        September 21, 2022     Patient: Marlena Reeves   YOB: 1985   Date of Visit: 9/21/2022       To Whom It May Concern: It is my medical opinion that Claudio Casas may return to work on 10/1/2022. If you have any questions or concerns, please don't hesitate to call.     Sincerely,        Magdiel Alford, DO

## 2022-09-21 NOTE — PATIENT INSTRUCTIONS
Patient Plan:  1. Recommend Echo to assess heart strength and function   -you will call to schedule this   2. Keep follow up with pulmonologist and recommended lung function tests  3. Recommend staying adequately hydrated   4. Follow-up with me as needed        Your provider has ordered testing for further evaluation. An order/prescription has been included in your paper work. To schedule outpatient testing, contact Central Scheduling by calling [address] [place] [address] ([phone]).

## 2022-09-29 ENCOUNTER — HOSPITAL ENCOUNTER (OUTPATIENT)
Dept: CARDIOLOGY | Age: 37
Discharge: HOME | End: 2022-09-29
Payer: COMMERCIAL

## 2022-09-29 DIAGNOSIS — R06.02 SHORTNESS OF BREATH: ICD-10-CM

## 2022-09-29 DIAGNOSIS — J93.83 OTHER PNEUMOTHORAX: ICD-10-CM

## 2022-09-29 DIAGNOSIS — R07.89 CHEST TIGHTNESS: ICD-10-CM

## 2022-09-29 LAB
LV EF: 58 %
LVEF MODALITY: NORMAL

## 2022-09-29 PROCEDURE — 93306 TTE W/DOPPLER COMPLETE: CPT

## 2022-10-04 ENCOUNTER — TELEPHONE (OUTPATIENT)
Dept: PULMONOLOGY | Age: 37
End: 2022-10-04

## 2022-10-04 ENCOUNTER — TELEPHONE (OUTPATIENT)
Dept: CARDIOLOGY CLINIC | Age: 37
End: 2022-10-04

## 2022-10-04 NOTE — TELEPHONE ENCOUNTER
----- Message from Paz Rose DO sent at 10/1/2022  9:25 AM EDT -----  Please let patient know that his recent TTE shows overall normal heart pumping function. There is trace leakiness of two of the heart valves which does not require treatment at this time. There was no fluid observed around the heart.

## 2022-10-04 NOTE — TELEPHONE ENCOUNTER
ZAHRA-- Called patient as a reminder that he needs a bronchial challenge before next appointment   He is now under care at [facility] and no longer wants to see Dr. Terri Vogel

## 2022-10-19 ASSESSMENT — ENCOUNTER SYMPTOMS
SORE THROAT: 0
VOMITING: 0
DIARRHEA: 0
BLOOD IN STOOL: 0
EYE PAIN: 0
COUGH: 1
ABDOMINAL PAIN: 0
NAUSEA: 0
PHOTOPHOBIA: 0
RHINORRHEA: 0
SHORTNESS OF BREATH: 1
CONSTIPATION: 0

## 2023-01-12 ENCOUNTER — TELEPHONE (OUTPATIENT)
Dept: CARDIOLOGY CLINIC | Age: 38
End: 2023-01-12
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
## 2023-01-12 NOTE — TELEPHONE ENCOUNTER
Returned patients call. He has concerns regarding his EKG taken at office visit 9/21/22. It showed Left anterior fascicular block. Despite reassurance and explanation of LAFB patient is worried and would like to know what risks this may mean in the future due to his family hx of AF? And will he need a repeat EKG or follow up? ZAHRA he feels fine in himself and is not having any cardiac symptoms.  Thanks

## 2023-01-16 NOTE — TELEPHONE ENCOUNTER
The presence of a left anterior fascicular block on EKG suggests that there is a \"delay\" in part of the electrical system of the heart. His TTE shows no significant structural abnormalities and the LAFB does not require specific treatment at this time. The presence of a LAFB can be associated with an increased risk of atrial fibrillation. If his palpitations become more frequent, we discuss further monitoring with a cardiac event monitor in the future. Thanks.

## 2023-01-16 NOTE — TELEPHONE ENCOUNTER
Called and spoke with patient. Relayed Dr Jaron Alvarado message. Patient verbally understood and states he will callback if he develops any symptoms in the future.

## 2023-02-23 ENCOUNTER — TELEPHONE (OUTPATIENT)
Dept: CARDIOLOGY CLINIC | Age: 38
End: 2023-02-23

## 2023-02-23 NOTE — TELEPHONE ENCOUNTER
I attempted to call the patient to relay cardiac event monitor results. Results showed NSR with rare PAC's and PVC's <0.01% and 0.04%. No sustained arrhythmias. LMOV.

## 2023-02-23 NOTE — TELEPHONE ENCOUNTER
Spoke with the patient and relayed results. He V/U. Patient was encouraged to call his PCP for requested blood work.

## 2023-09-14 ENCOUNTER — APPOINTMENT (OUTPATIENT)
Dept: GENERAL RADIOLOGY | Age: 38
End: 2023-09-14
Payer: COMMERCIAL

## 2023-09-14 ENCOUNTER — HOSPITAL ENCOUNTER (EMERGENCY)
Age: 38
Discharge: HOME | End: 2023-09-14
Attending: EMERGENCY MEDICINE
Payer: COMMERCIAL

## 2023-09-14 VITALS
TEMPERATURE: 98.1 F | HEIGHT: 71 IN | HEART RATE: 100 BPM | SYSTOLIC BLOOD PRESSURE: 134 MMHG | RESPIRATION RATE: 18 BRPM | BODY MASS INDEX: 19.46 KG/M2 | OXYGEN SATURATION: 95 % | DIASTOLIC BLOOD PRESSURE: 72 MMHG | WEIGHT: 139 LBS

## 2023-09-14 DIAGNOSIS — S62.609B OPEN FRACTURE OF PHALANX OF DIGIT OF HAND, INITIAL ENCOUNTER: ICD-10-CM

## 2023-09-14 DIAGNOSIS — S61.239A GUNSHOT WOUND OF FINGER, INITIAL ENCOUNTER: Primary | ICD-10-CM

## 2023-09-14 LAB
ALBUMIN SERPL-MCNC: 4.9 G/DL (ref 3.4–5)
ALBUMIN/GLOB SERPL: 1.8 {RATIO} (ref 1.1–2.2)
ALP SERPL-CCNC: 42 U/L (ref 40–129)
ALT SERPL-CCNC: 13 U/L (ref 10–40)
ANION GAP SERPL CALCULATED.3IONS-SCNC: 14 MMOL/L (ref 3–16)
AST SERPL-CCNC: 16 U/L (ref 15–37)
BASOPHILS # BLD: 0 K/UL (ref 0–0.2)
BASOPHILS NFR BLD: 0.5 %
BILIRUB SERPL-MCNC: 1.2 MG/DL (ref 0–1)
BUN SERPL-MCNC: 11 MG/DL (ref 7–20)
CALCIUM SERPL-MCNC: 9.7 MG/DL (ref 8.3–10.6)
CHLORIDE SERPL-SCNC: 98 MMOL/L (ref 99–110)
CO2 SERPL-SCNC: 26 MMOL/L (ref 21–32)
CREAT SERPL-MCNC: 1.1 MG/DL (ref 0.9–1.3)
DEPRECATED RDW RBC AUTO: 13.7 % (ref 12.4–15.4)
EOSINOPHIL # BLD: 0 K/UL (ref 0–0.6)
EOSINOPHIL NFR BLD: 0.1 %
GFR SERPLBLD CREATININE-BSD FMLA CKD-EPI: >60 ML/MIN/{1.73_M2}
GLUCOSE SERPL-MCNC: 126 MG/DL (ref 70–99)
HCT VFR BLD AUTO: 47.1 % (ref 40.5–52.5)
HGB BLD-MCNC: 16.4 G/DL (ref 13.5–17.5)
LYMPHOCYTES # BLD: 1.6 K/UL (ref 1–5.1)
LYMPHOCYTES NFR BLD: 16.5 %
MCH RBC QN AUTO: 30.8 PG (ref 26–34)
MCHC RBC AUTO-ENTMCNC: 34.8 G/DL (ref 31–36)
MCV RBC AUTO: 88.3 FL (ref 80–100)
MONOCYTES # BLD: 0.8 K/UL (ref 0–1.3)
MONOCYTES NFR BLD: 8 %
NEUTROPHILS # BLD: 7.3 K/UL (ref 1.7–7.7)
NEUTROPHILS NFR BLD: 74.9 %
PLATELET # BLD AUTO: 240 K/UL (ref 135–450)
PMV BLD AUTO: 7.2 FL (ref 5–10.5)
POTASSIUM SERPL-SCNC: 3.2 MMOL/L (ref 3.5–5.1)
PROT SERPL-MCNC: 7.7 G/DL (ref 6.4–8.2)
RBC # BLD AUTO: 5.34 M/UL (ref 4.2–5.9)
SODIUM SERPL-SCNC: 138 MMOL/L (ref 136–145)
WBC # BLD AUTO: 9.7 K/UL (ref 4–11)

## 2023-09-14 PROCEDURE — 80053 COMPREHEN METABOLIC PANEL: CPT

## 2023-09-14 PROCEDURE — 6360000002 HC RX W HCPCS: Performed by: EMERGENCY MEDICINE

## 2023-09-14 PROCEDURE — 99252 IP/OBS CONSLTJ NEW/EST SF 35: CPT | Performed by: SPECIALIST/TECHNOLOGIST

## 2023-09-14 PROCEDURE — 85025 COMPLETE CBC W/AUTO DIFF WBC: CPT

## 2023-09-14 PROCEDURE — 99284 EMERGENCY DEPT VISIT MOD MDM: CPT

## 2023-09-14 PROCEDURE — 73140 X-RAY EXAM OF FINGER(S): CPT

## 2023-09-14 PROCEDURE — 96365 THER/PROPH/DIAG IV INF INIT: CPT

## 2023-09-14 PROCEDURE — 6370000000 HC RX 637 (ALT 250 FOR IP): Performed by: EMERGENCY MEDICINE

## 2023-09-14 PROCEDURE — 29125 APPL SHORT ARM SPLINT STATIC: CPT

## 2023-09-14 RX ORDER — CEPHALEXIN 500 MG/1
500 CAPSULE ORAL 4 TIMES DAILY
Qty: 20 CAPSULE | Refills: 0 | Status: SHIPPED | OUTPATIENT
Start: 2023-09-14 | End: 2023-09-19

## 2023-09-14 RX ORDER — OXYCODONE HYDROCHLORIDE AND ACETAMINOPHEN 5; 325 MG/1; MG/1
1 TABLET ORAL
Status: DISCONTINUED | OUTPATIENT
Start: 2023-09-14 | End: 2023-09-14 | Stop reason: HOSPADM

## 2023-09-14 RX ORDER — ONDANSETRON 4 MG/1
4 TABLET, ORALLY DISINTEGRATING ORAL ONCE
Status: COMPLETED | OUTPATIENT
Start: 2023-09-14 | End: 2023-09-14

## 2023-09-14 RX ORDER — CEFAZOLIN SODIUM IN 0.9 % NACL 2 G/100 ML
2000 PLASTIC BAG, INJECTION (ML) INTRAVENOUS ONCE
Status: COMPLETED | OUTPATIENT
Start: 2023-09-14 | End: 2023-09-14

## 2023-09-14 RX ORDER — SULFAMETHOXAZOLE AND TRIMETHOPRIM 800; 160 MG/1; MG/1
1 TABLET ORAL 2 TIMES DAILY
Qty: 14 TABLET | Refills: 0 | Status: SHIPPED | OUTPATIENT
Start: 2023-09-14 | End: 2023-09-21

## 2023-09-14 RX ADMIN — Medication 2000 MG: at 09:45

## 2023-09-14 RX ADMIN — ONDANSETRON 4 MG: 4 TABLET, ORALLY DISINTEGRATING ORAL at 09:32

## 2023-09-14 ASSESSMENT — PAIN - FUNCTIONAL ASSESSMENT: PAIN_FUNCTIONAL_ASSESSMENT: 0-10

## 2023-09-14 ASSESSMENT — PAIN DESCRIPTION - LOCATION: LOCATION: FINGER (COMMENT WHICH ONE)

## 2023-09-14 ASSESSMENT — PAIN SCALES - GENERAL: PAINLEVEL_OUTOF10: 5

## 2023-09-14 ASSESSMENT — PAIN DESCRIPTION - ORIENTATION: ORIENTATION: LEFT

## 2023-09-14 NOTE — ED NOTES
Patient ok with blood draw and PIV access. Wants to wait on the abx at this time. Still waiting on ortho.       Alis Eagle  09/14/23 8763
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
## 2023-09-14 NOTE — ED NOTES
Went to patient's room with Dr Lexa Sherman to explain the reason behind the blood draw and medication. Patient requested writer step out. Dr Lexa Sherman explained the reasoning behind the medications and blood draw. She also updated patient on orthopaedics and the eta of 0800. Isle  now at pt bedside for [address] report.       [facility], [address] [place] [address]  09/14/23 0444
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
## 2023-09-14 NOTE — ED NOTES
Cleaned pts's wound on index finger on left hand with 2*2 gauze,dynahex, 500ml of irrigation solation and irrigated the wound with splash guard and 10ml syringe. Put Radial Gutter Splint on pt with 3\" ortho glass, 3\" webril and 1 4\" ace wrap, 1 2\" ace wrap. Pt tolerated well. No questions. Pt stated He was up to date on Tetanus shot.  aware.      Took Pt's IV Out.

## 2023-09-14 NOTE — ED NOTES
Pt states that he got his last tetanus shot in 2019 and refuses shot today, documented on [facility] and Dr. Zion Workman aware.      Handy Allen, ELIA  09/14/23 2572

## 2023-09-14 NOTE — ED NOTES
Dr. Sherlynn Bamberger perfectserved De Dixon came down and seen pt and Dr. Berto Queen  09/14/23 1300 [facility]  09/14/23 8798

## 2023-09-14 NOTE — DISCHARGE INSTRUCTIONS
Please follow up with Dr. Harshal Crowe with Orthopedics ASAP within 1-2 days for further evaluation and treatment. Take the antibiotics as prescribed. If persistent or worsening symptoms, or if you have any concerns, return to ED immediately.

## 2023-09-14 NOTE — ED NOTES
Patient refusing blood draw and abx at this time. He is pacing the room stating he hates needles. Pt questioning the reason behind the blood draw and antibiotics. Writer explained the reasoning and patient asked if he has to go to [facility] for hand surgery. This nurse explained to him that we are waiting for a call back from [facility] and we are trying to treat prophylactic to stay ahead of any infection. Pt requesting to see the doctor.         Alis Lugo  09/14/23 5303
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
## 2023-09-14 NOTE — ED NOTES
Dr.Song lopez Orthopedic Surgery      [facility]  09/14/23 5766